# Patient Record
Sex: FEMALE | Race: WHITE | NOT HISPANIC OR LATINO | Employment: FULL TIME | URBAN - NONMETROPOLITAN AREA
[De-identification: names, ages, dates, MRNs, and addresses within clinical notes are randomized per-mention and may not be internally consistent; named-entity substitution may affect disease eponyms.]

---

## 2023-03-28 DIAGNOSIS — I10 ESSENTIAL (PRIMARY) HYPERTENSION: ICD-10-CM

## 2023-03-28 RX ORDER — LOSARTAN POTASSIUM 50 MG/1
TABLET ORAL
Qty: 45 TABLET | Refills: 3 | Status: SHIPPED | OUTPATIENT
Start: 2023-03-28 | End: 2024-02-06 | Stop reason: ALTCHOICE

## 2023-04-30 DIAGNOSIS — I10 ESSENTIAL (PRIMARY) HYPERTENSION: ICD-10-CM

## 2023-05-01 RX ORDER — LOSARTAN POTASSIUM 25 MG/1
TABLET ORAL
Qty: 90 TABLET | Refills: 11 | Status: SHIPPED | OUTPATIENT
Start: 2023-05-01 | End: 2023-11-27 | Stop reason: ALTCHOICE

## 2023-11-27 ENCOUNTER — OFFICE VISIT (OUTPATIENT)
Dept: PRIMARY CARE | Facility: CLINIC | Age: 43
End: 2023-11-27
Payer: COMMERCIAL

## 2023-11-27 VITALS
OXYGEN SATURATION: 98 % | WEIGHT: 152 LBS | BODY MASS INDEX: 23.86 KG/M2 | HEIGHT: 67 IN | DIASTOLIC BLOOD PRESSURE: 116 MMHG | HEART RATE: 92 BPM | SYSTOLIC BLOOD PRESSURE: 171 MMHG

## 2023-11-27 DIAGNOSIS — I10 BENIGN ESSENTIAL HYPERTENSION: ICD-10-CM

## 2023-11-27 DIAGNOSIS — Z87.442 PERSONAL HISTORY OF RENAL CALCULI: ICD-10-CM

## 2023-11-27 DIAGNOSIS — R10.9 RIGHT FLANK PAIN: Primary | ICD-10-CM

## 2023-11-27 DIAGNOSIS — R11.0 NAUSEA: ICD-10-CM

## 2023-11-27 PROBLEM — R79.82 CRP ELEVATED: Status: RESOLVED | Noted: 2023-11-27 | Resolved: 2023-11-27

## 2023-11-27 PROBLEM — M19.90 OSTEOARTHRITIS: Status: ACTIVE | Noted: 2023-11-27

## 2023-11-27 PROBLEM — R79.89 LFT ELEVATION: Status: RESOLVED | Noted: 2023-11-27 | Resolved: 2023-11-27

## 2023-11-27 PROBLEM — K57.90 DIVERTICULOSIS: Status: RESOLVED | Noted: 2023-11-27 | Resolved: 2023-11-27

## 2023-11-27 PROBLEM — H81.10 BENIGN POSITIONAL VERTIGO: Status: RESOLVED | Noted: 2023-11-27 | Resolved: 2023-11-27

## 2023-11-27 PROBLEM — N20.0 CALCULUS OF KIDNEY: Status: RESOLVED | Noted: 2023-11-27 | Resolved: 2023-11-27

## 2023-11-27 PROBLEM — F41.1 GENERALIZED ANXIETY DISORDER WITH PANIC ATTACKS: Status: ACTIVE | Noted: 2023-11-27

## 2023-11-27 PROBLEM — R63.0 POOR APPETITE: Status: ACTIVE | Noted: 2023-11-27

## 2023-11-27 PROBLEM — R70.0 ELEVATED ERYTHROCYTE SEDIMENTATION RATE: Status: RESOLVED | Noted: 2023-11-27 | Resolved: 2023-11-27

## 2023-11-27 PROBLEM — R35.1 NOCTURIA: Status: ACTIVE | Noted: 2023-11-27

## 2023-11-27 PROBLEM — M25.50 ARTHRALGIA: Status: ACTIVE | Noted: 2023-11-27

## 2023-11-27 PROBLEM — J30.9 ALLERGIC RHINITIS: Status: ACTIVE | Noted: 2023-11-27

## 2023-11-27 PROBLEM — M67.431 GANGLION, RIGHT WRIST: Status: RESOLVED | Noted: 2019-03-05 | Resolved: 2023-11-27

## 2023-11-27 PROBLEM — I88.9 LYMPHADENITIS: Status: RESOLVED | Noted: 2023-11-27 | Resolved: 2023-11-27

## 2023-11-27 PROBLEM — F41.0 GENERALIZED ANXIETY DISORDER WITH PANIC ATTACKS: Status: ACTIVE | Noted: 2023-11-27

## 2023-11-27 PROBLEM — F43.10 PTSD (POST-TRAUMATIC STRESS DISORDER): Status: ACTIVE | Noted: 2023-11-27

## 2023-11-27 PROBLEM — M77.00 MEDIAL EPICONDYLITIS: Status: RESOLVED | Noted: 2023-11-27 | Resolved: 2023-11-27

## 2023-11-27 PROBLEM — Z86.16 HISTORY OF COVID-19: Status: RESOLVED | Noted: 2023-11-27 | Resolved: 2023-11-27

## 2023-11-27 LAB
POC APPEARANCE, URINE: CLEAR
POC BILIRUBIN, URINE: NEGATIVE
POC BLOOD, URINE: ABNORMAL
POC COLOR, URINE: YELLOW
POC GLUCOSE, URINE: NEGATIVE MG/DL
POC KETONES, URINE: NEGATIVE MG/DL
POC LEUKOCYTES, URINE: NEGATIVE
POC NITRITE,URINE: NEGATIVE
POC PH, URINE: 5.5 PH
POC PROTEIN, URINE: NEGATIVE MG/DL
POC SPECIFIC GRAVITY, URINE: 1.01
POC UROBILINOGEN, URINE: 0.2 EU/DL

## 2023-11-27 PROCEDURE — 87086 URINE CULTURE/COLONY COUNT: CPT

## 2023-11-27 PROCEDURE — 99214 OFFICE O/P EST MOD 30 MIN: CPT | Performed by: PHYSICIAN ASSISTANT

## 2023-11-27 PROCEDURE — 1036F TOBACCO NON-USER: CPT | Performed by: PHYSICIAN ASSISTANT

## 2023-11-27 PROCEDURE — 81003 URINALYSIS AUTO W/O SCOPE: CPT | Performed by: PHYSICIAN ASSISTANT

## 2023-11-27 PROCEDURE — 3077F SYST BP >= 140 MM HG: CPT | Performed by: PHYSICIAN ASSISTANT

## 2023-11-27 PROCEDURE — 3080F DIAST BP >= 90 MM HG: CPT | Performed by: PHYSICIAN ASSISTANT

## 2023-11-27 RX ORDER — TRAMADOL HYDROCHLORIDE 50 MG/1
50 TABLET ORAL EVERY 8 HOURS PRN
Qty: 9 TABLET | Refills: 0 | Status: SHIPPED | OUTPATIENT
Start: 2023-11-27 | End: 2023-11-30

## 2023-11-27 RX ORDER — HYDROCHLOROTHIAZIDE 25 MG/1
25 TABLET ORAL DAILY
COMMUNITY
Start: 2017-12-29 | End: 2024-03-13 | Stop reason: SDUPTHER

## 2023-11-27 RX ORDER — PROMETHAZINE HYDROCHLORIDE 25 MG/1
25 TABLET ORAL EVERY 8 HOURS PRN
Qty: 30 TABLET | Refills: 0 | Status: SHIPPED | OUTPATIENT
Start: 2023-11-27 | End: 2024-02-06 | Stop reason: ALTCHOICE

## 2023-11-27 ASSESSMENT — ENCOUNTER SYMPTOMS
VOMITING: 0
CONSTIPATION: 0
SLEEP DISTURBANCE: 0
FATIGUE: 1
DIARRHEA: 0
CHEST TIGHTNESS: 0
ABDOMINAL PAIN: 0
EYE DISCHARGE: 0
FLANK PAIN: 1
BACK PAIN: 0
BRUISES/BLEEDS EASILY: 0
CHILLS: 0
NAUSEA: 1
RHINORRHEA: 0
DIZZINESS: 0
SINUS PAIN: 0
HEADACHES: 0
WHEEZING: 0
CONFUSION: 0
NUMBNESS: 0
NECK PAIN: 0
SHORTNESS OF BREATH: 0
PALPITATIONS: 0
SORE THROAT: 0
EYE REDNESS: 0
FREQUENCY: 0
FEVER: 0
COUGH: 0
WOUND: 0
TREMORS: 0

## 2023-11-27 ASSESSMENT — PATIENT HEALTH QUESTIONNAIRE - PHQ9
SUM OF ALL RESPONSES TO PHQ9 QUESTIONS 1 AND 2: 0
1. LITTLE INTEREST OR PLEASURE IN DOING THINGS: NOT AT ALL
2. FEELING DOWN, DEPRESSED OR HOPELESS: NOT AT ALL

## 2023-11-27 NOTE — PROGRESS NOTES
Subjective   Patient ID: Elva Perez is a 43 y.o. female who presents for Follow-up (R-FLANK PAIN X 5 DAYS NOW. HAS NOT NOTICED ANY BLOOD IN HER URINE. STATED SHE DID HAVE SOME NAUSEA TOO.  HX OF KIDNEY STONES ABOUT 5 YEARS AGO SHE STATES HER SYMP FROM THAT FEEL FAMILIAR. )    HPI  Pt presents today as MMT patient for     1 possible kidney stones   R flank pain x 5 days  In office urine small blood noted   Neg leukocytes and nitrates but will send to culture given her symptoms  Nausea   She does have hx of kidney stones years ago and feels similar   Pt states hx of following with uro - dr foster   Pt states she has been taking tylenol and ibuprofen and min relief (originally she thought was M/S) but has since progressed   Heat and little relief   Pt denies known fever    HTN - on losartan and hydrochlorothiazide - admits she is not faithful taking meds and did not take yet today.  She denies symptoms of high BP       Preventative   Fall - NEG NOV 2023   PHQ2 NEG NOV 2023     There is no problem list on file for this patient.      Review of Systems   Constitutional:  Positive for fatigue. Negative for chills and fever.   HENT:  Negative for congestion, rhinorrhea, sinus pain, sore throat and tinnitus.    Eyes:  Negative for discharge, redness and visual disturbance.   Respiratory:  Negative for cough, chest tightness, shortness of breath and wheezing.    Cardiovascular:  Negative for chest pain, palpitations and leg swelling.   Gastrointestinal:  Positive for nausea. Negative for abdominal pain, constipation, diarrhea and vomiting.   Endocrine: Negative for cold intolerance and heat intolerance.   Genitourinary:  Positive for decreased urine volume and flank pain. Negative for frequency and urgency.   Musculoskeletal:  Negative for back pain, gait problem and neck pain.   Skin:  Negative for rash and wound.   Neurological:  Negative for dizziness, tremors, syncope, numbness and headaches.   Hematological:   "Does not bruise/bleed easily.   Psychiatric/Behavioral:  Negative for confusion, sleep disturbance and suicidal ideas.        Past Medical History:   Diagnosis Date    Encounter for gynecological examination (general) (routine) without abnormal findings     Encounter for cervical Pap smear with pelvic exam    Immunization not carried out because of patient refusal     Influenza vaccination declined    Personal history of other diseases of the female genital tract 2020    History of endometriosis    Personal history of other diseases of the female genital tract     History of dyspareunia in female    Personal history of other medical treatment     History of diagnostic mammography       Past Surgical History:   Procedure Laterality Date    OTHER SURGICAL HISTORY  10/09/2019    Hysterectomy total    OTHER SURGICAL HISTORY  10/09/2019     section    OTHER SURGICAL HISTORY  2020    Lithotripsy    OTHER SURGICAL HISTORY  2020    Laparoscopy    OTHER SURGICAL HISTORY  2021    Abdominal liposuction       Family History   Problem Relation Name Age of Onset    Lung cancer Mother      Lung cancer Father      Diabetes Father      Cancer Sister         Social History     Tobacco Use    Smoking status: Never    Smokeless tobacco: Never   Vaping Use    Vaping Use: Never used   Substance Use Topics    Alcohol use: Yes     Comment: SOCIALLY    Drug use: Never       Allergies   Allergen Reactions    Magnesium Sulfate Other     Fluid on lungs    Codeine Unknown       Current Outpatient Medications   Medication Sig Dispense Refill    hydroCHLOROthiazide (HYDRODiuril) 25 mg tablet Take 1 tablet (25 mg) by mouth once daily.      losartan (Cozaar) 50 mg tablet TAKE 1.5 TABLETS BY MOUTH DAILY 45 tablet 3     No current facility-administered medications for this visit.       Objective   BP (!) 171/116   Pulse 92   Ht 1.702 m (5' 7\")   Wt 68.9 kg (152 lb)   SpO2 98%   BMI 23.81 kg/m²     Physical " Exam  Vitals reviewed.   Constitutional:       Appearance: Normal appearance. She is normal weight.   HENT:      Head: Normocephalic.      Right Ear: External ear normal.      Left Ear: External ear normal.      Nose: Nose normal. No congestion or rhinorrhea.      Mouth/Throat:      Mouth: Mucous membranes are moist.   Eyes:      Extraocular Movements: Extraocular movements intact.      Conjunctiva/sclera: Conjunctivae normal.      Pupils: Pupils are equal, round, and reactive to light.   Cardiovascular:      Rate and Rhythm: Normal rate and regular rhythm.      Pulses: Normal pulses.   Pulmonary:      Effort: Pulmonary effort is normal.      Breath sounds: Normal breath sounds.   Abdominal:      General: Bowel sounds are normal.      Palpations: Abdomen is soft.      Tenderness: There is no abdominal tenderness. There is right CVA tenderness. There is no left CVA tenderness.   Musculoskeletal:         General: No tenderness. Normal range of motion.      Cervical back: Normal range of motion and neck supple. No tenderness.   Skin:     General: Skin is warm and dry.   Neurological:      General: No focal deficit present.      Mental Status: She is alert and oriented to person, place, and time.   Psychiatric:         Mood and Affect: Mood normal.         Behavior: Behavior normal.       Testing   Reviewed old imaging on file       Impression    MDM    1) COMPLEXITY: 1 UNDIAGNOSED NEW PROBLEM WITH UNCERTAIN PROGNOSIS  2)DATA: TESTS INTERPRETED AND OR ORDERED, TOOK INDEPENDENT HISTORY OR RECORDS REVIEWED  3)RISK: MODERATE RISK DUE TO NATURE OF MEDICAL CONDITIONS/COMORBIDITY OR MEDICATIONS ORDERED OR SURGICAL OR PROCEDURE REFERRAL, .       Reviewed labs and Testing on file   Patient to follow diet low in cholesterol, fat, and sodium.    Patient is advised to increase Exercise.  Patient is recommended to lose weight.  Reviewed Meds and discussed common side effects  Continue as directed   Flank pain - suspicious of kidney  stones with her history - will order imaging and assist with follow up with her urologist  Phenergan given for prn   Pain is progressing and no relief with NSAID or tylenol  I will give a 1 time 3 day supply of ultram prn - pt denies s.e or known allergy with this med  She is to use sparingly   Reviewed OARRS and is consistent and appropriate with prescribed medicines.  We discussed and considered risks for abuse, addiction, diversion, dependence. Medicine is felt to be clinically appropriate based on documented diagnosis  Contract not signs and urine not done as this was short time 1 time prescription   Consider script for flomax as discussed   Pt to tx supportively   If sx worsen to go to th ER   HTN - not to goal - not taking meds as directed - adivsed to restart her meds   Discussed need for follow up with labs and gen check up when the R flank pain is better managed   Patient is strongly advised to be compliant with recommendations.    Return to Clinic sooner if needed.  Patient denies further questions/concerns at this time     Assessment/Plan   Problem List Items Addressed This Visit             ICD-10-CM    Benign essential hypertension I10     Other Visit Diagnoses         Codes    Right flank pain    -  Primary R10.9    Relevant Medications    promethazine (Phenergan) 25 mg tablet    traMADol (Ultram) 50 mg tablet    Other Relevant Orders    POCT UA Automated manually resulted (Completed)    Urine Culture    XR abdomen 1 view    Personal history of renal calculi     Z87.442    Nausea     R11.0            FU in 3-5 days with flank pain check   Call dr foster to see if we can assist with visit - last seen 2021 - hx of kidney stones   If patient can get in with dr foster in the near future then consider follow up in our office when the kidney stones are better managed for gen check up - BP and labs (labs will need ordered as she was last seen 6 mo ago)

## 2023-11-29 LAB — BACTERIA UR CULT: NO GROWTH

## 2024-02-06 ENCOUNTER — OFFICE VISIT (OUTPATIENT)
Dept: PRIMARY CARE | Facility: CLINIC | Age: 44
End: 2024-02-06
Payer: COMMERCIAL

## 2024-02-06 VITALS
OXYGEN SATURATION: 98 % | BODY MASS INDEX: 24.75 KG/M2 | SYSTOLIC BLOOD PRESSURE: 168 MMHG | HEART RATE: 96 BPM | WEIGHT: 154 LBS | HEIGHT: 66 IN | DIASTOLIC BLOOD PRESSURE: 110 MMHG

## 2024-02-06 DIAGNOSIS — I10 BENIGN ESSENTIAL HYPERTENSION: ICD-10-CM

## 2024-02-06 DIAGNOSIS — F41.0 GENERALIZED ANXIETY DISORDER WITH PANIC ATTACKS: Primary | ICD-10-CM

## 2024-02-06 DIAGNOSIS — M19.90 OSTEOARTHRITIS, UNSPECIFIED OSTEOARTHRITIS TYPE, UNSPECIFIED SITE: ICD-10-CM

## 2024-02-06 DIAGNOSIS — J34.89 NOSE IRRITATION: ICD-10-CM

## 2024-02-06 DIAGNOSIS — Z00.00 WELLNESS EXAMINATION: ICD-10-CM

## 2024-02-06 DIAGNOSIS — F41.1 GENERALIZED ANXIETY DISORDER WITH PANIC ATTACKS: Primary | ICD-10-CM

## 2024-02-06 PROCEDURE — 1036F TOBACCO NON-USER: CPT | Performed by: NURSE PRACTITIONER

## 2024-02-06 PROCEDURE — 99214 OFFICE O/P EST MOD 30 MIN: CPT | Performed by: NURSE PRACTITIONER

## 2024-02-06 PROCEDURE — 3080F DIAST BP >= 90 MM HG: CPT | Performed by: NURSE PRACTITIONER

## 2024-02-06 PROCEDURE — 3077F SYST BP >= 140 MM HG: CPT | Performed by: NURSE PRACTITIONER

## 2024-02-06 RX ORDER — BUPROPION HYDROCHLORIDE 150 MG/1
150 TABLET ORAL EVERY MORNING
Qty: 90 TABLET | Refills: 0 | Status: SHIPPED | OUTPATIENT
Start: 2024-02-06 | End: 2024-03-13 | Stop reason: ALTCHOICE

## 2024-02-06 RX ORDER — BUSPIRONE HYDROCHLORIDE 5 MG/1
5 TABLET ORAL 3 TIMES DAILY PRN
Qty: 90 TABLET | Refills: 0 | Status: SHIPPED | OUTPATIENT
Start: 2024-02-06 | End: 2024-03-01 | Stop reason: SDUPTHER

## 2024-02-06 RX ORDER — FERROUS SULFATE, DRIED 160(50) MG
1 TABLET, EXTENDED RELEASE ORAL 2 TIMES DAILY
Qty: 180 TABLET | Refills: 3 | Status: SHIPPED | OUTPATIENT
Start: 2024-02-06

## 2024-02-06 RX ORDER — MELOXICAM 7.5 MG/1
7.5 TABLET ORAL DAILY
Qty: 90 TABLET | Refills: 0 | Status: SHIPPED | OUTPATIENT
Start: 2024-02-06 | End: 2024-03-13 | Stop reason: ALTCHOICE

## 2024-02-06 RX ORDER — LOSARTAN POTASSIUM 100 MG/1
100 TABLET ORAL DAILY
Qty: 90 TABLET | Refills: 3 | Status: SHIPPED | OUTPATIENT
Start: 2024-02-06

## 2024-02-06 RX ORDER — MUPIROCIN 20 MG/G
OINTMENT TOPICAL 3 TIMES DAILY
Qty: 22 G | Refills: 0 | Status: SHIPPED | OUTPATIENT
Start: 2024-02-06 | End: 2024-02-16

## 2024-02-06 ASSESSMENT — ENCOUNTER SYMPTOMS
CONSTIPATION: 0
WOUND: 0
COUGH: 0
SHORTNESS OF BREATH: 0
JOINT SWELLING: 0
FREQUENCY: 0
BLOOD IN STOOL: 0
NAUSEA: 0
FATIGUE: 0
PALPITATIONS: 0
APNEA: 0
FLANK PAIN: 0
FACIAL ASYMMETRY: 0
TROUBLE SWALLOWING: 0
DIARRHEA: 0
VOMITING: 0
DIZZINESS: 0
ABDOMINAL PAIN: 0
DYSURIA: 0
WHEEZING: 0
HEMATURIA: 0
NUMBNESS: 0
HEADACHES: 0
MYALGIAS: 0
CHEST TIGHTNESS: 0
UNEXPECTED WEIGHT CHANGE: 0
FEVER: 0
WEAKNESS: 0
NECK PAIN: 0
CONFUSION: 0
SEIZURES: 0
NERVOUS/ANXIOUS: 1
SPEECH DIFFICULTY: 0
BACK PAIN: 0
CHOKING: 0
DIFFICULTY URINATING: 0
PHOTOPHOBIA: 0
CHILLS: 0
ARTHRALGIAS: 0
EYE PAIN: 0
EYE REDNESS: 0
SLEEP DISTURBANCE: 0
SORE THROAT: 0
ABDOMINAL DISTENTION: 0

## 2024-02-06 ASSESSMENT — PATIENT HEALTH QUESTIONNAIRE - PHQ9
2. FEELING DOWN, DEPRESSED OR HOPELESS: NOT AT ALL
1. LITTLE INTEREST OR PLEASURE IN DOING THINGS: NOT AT ALL
SUM OF ALL RESPONSES TO PHQ9 QUESTIONS 1 AND 2: 0

## 2024-02-06 NOTE — PROGRESS NOTES
"Subjective   Patient ID: Elva Perez is a 43 y.o. female who presents for Med Refill (PT C/O NOSE BLEEDS, PT C/O DRY NOSE X 1 MONTH . PT WOULD ALSO LIKE TO RE START WELLBUTRIN ).      HPI:  Presents today for C/O DRY NOSE X 1 MONTH  modifying factors consists of NONE  associated symptoms consist of STATES NOSE BLEEDS ON/OFF. NASAL PAIN.  prior treatment consists of medication HUMIDIFIER, WHICH HAS HELPED.     HTN- INCREASE LOSARTAN  MG FROM 50 MG. MONITOR BP AT HOME  ANXIETY- REQUESTING TO RESTART WELLBUTRIN. START BUSPAR PRN   MAMMO- REFUSING AT THIS TIME  OA- START MOBIC       Visit Vitals  BP (!) 168/110 (BP Location: Left arm, Patient Position: Sitting)   Pulse 96   Ht 1.676 m (5' 6\")   Wt 69.9 kg (154 lb)   SpO2 98%   BMI 24.86 kg/m²   Smoking Status Never   BSA 1.8 m²        Review of Systems   Constitutional:  Negative for chills, fatigue, fever and unexpected weight change.   HENT:  Negative for congestion, ear pain, sore throat and trouble swallowing.    Eyes:  Negative for photophobia, pain, redness and visual disturbance.   Respiratory:  Negative for apnea, cough, choking, chest tightness, shortness of breath and wheezing.    Cardiovascular:  Negative for chest pain, palpitations and leg swelling.   Gastrointestinal:  Negative for abdominal distention, abdominal pain, blood in stool, constipation, diarrhea, nausea and vomiting.   Genitourinary:  Negative for difficulty urinating, dysuria, flank pain, frequency, hematuria and urgency.   Musculoskeletal:  Negative for arthralgias, back pain, gait problem, joint swelling, myalgias and neck pain.   Skin:  Negative for rash and wound.   Neurological:  Negative for dizziness, seizures, syncope, facial asymmetry, speech difficulty, weakness, numbness and headaches.   Psychiatric/Behavioral:  Negative for confusion, sleep disturbance and suicidal ideas. The patient is nervous/anxious.        Objective     Physical Exam  Constitutional:       " Appearance: Normal appearance. She is normal weight.   HENT:      Head: Normocephalic.      Nose:      Comments: B/L TURBINATES RED AND INFLAMED   Eyes:      Extraocular Movements: Extraocular movements intact.      Conjunctiva/sclera: Conjunctivae normal.      Pupils: Pupils are equal, round, and reactive to light.   Cardiovascular:      Rate and Rhythm: Normal rate and regular rhythm.      Pulses: Normal pulses.      Heart sounds: Normal heart sounds.   Pulmonary:      Effort: Pulmonary effort is normal.      Breath sounds: Normal breath sounds.   Musculoskeletal:         General: Normal range of motion.      Cervical back: Normal range of motion.   Skin:     General: Skin is warm and dry.   Neurological:      General: No focal deficit present.      Mental Status: She is alert and oriented to person, place, and time.   Psychiatric:         Mood and Affect: Mood normal.         Behavior: Behavior normal.         Thought Content: Thought content normal.         Judgment: Judgment normal.            Assessment/Plan   Problem List Items Addressed This Visit       Benign essential hypertension    Relevant Medications    losartan (Cozaar) 100 mg tablet    Generalized anxiety disorder with panic attacks - Primary    Relevant Medications    buPROPion XL (Wellbutrin XL) 150 mg 24 hr tablet    busPIRone (Buspar) 5 mg tablet    Osteoarthritis    Relevant Medications    meloxicam (Mobic) 7.5 mg tablet     Other Visit Diagnoses       Nose irritation        Relevant Medications    mupirocin (Bactroban) 2 % ointment    Wellness examination        Relevant Medications    calcium carbonate-vitamin D3 (Hi-Caleb Plus Vit D) 500 mg-5 mcg (200 unit) tablet    Other Relevant Orders    Hemoglobin A1C    TSH with reflex to Free T4 if abnormal    Lipid Panel    Comprehensive Metabolic Panel    CBC and Auto Differential        WE DISCUSSED MOST COMMON SIDE EFFECTS OF PRESCRIBED MEDICATIONS. INDICATIONS, RISK, COMPLICATIONS, AND ALTERNATIVES  OF MEDICATION/THERAPEUTICS WERE EXPLAINED AND DISCUSSED. PLEASE MONITOR CLOSELY FOR ANY UNTOWARD SIDE EFFECTS OR COMPLICATIONS OF MEDICATIONS. PATIENT IS STRONGLY ADVISED TO BE COMPLIANT WITH RECOMMENDATIONS. QUESTIONS AND CONCERNS WERE ADDRESSED. INSTRUCTED TO CALL, RETURN SOONER, OR GO TO THE ER,  IF SYMPTOMS PERSIST OR WORSEN. THEY VOICED UNDERSTANDING AND  DENIES FURTHER QUESTIONS AT THIS TIME.    TIME CODE  1. PREPARATION FOR PATIENT'S VISIT (REVIEWING CHART, CURRENT MEDICAL RECORDS, OUTSIDE HEALTH PROVIDER RECORDS, PREVIOUS HISTORY, EXAM, TEST, PROCEDURE, AND MEDICATIONS)  2. FACE TO FACE ENCOUNTER OBTAINING HISTORY FROM THE PATIENT/FAMILY/CAREGIVERS; PERFORMING EVALUATION AND EXAMINATION; ORDERING TESTS OR PROCEDURES; REFERRING AND COMMUNICATING WITH OTHER HEALTHCARE PROVIDERS; COUNSELING AND EDUCATION OF THE PATIENT/FAMILY/CAREGIVERS; INDEPENDENTLY INTERPRETING RESULTS (TESTS, LABS, PROCEDURES, IMAGING) AND COMMUNICATING AND EXPLAINING RESULTS TO THE PATIENT/FAMILY/CAREGIVERS  3. COORDINATION OF CARE; PREPARING AND PRINTING DISCHARGE INSTRUCTIONS AND ANY EDUCATIONAL MATERIAL FOR THE PATIENT/FAMILY/CAREGIVERS. DOCUMENTING CLINICAL INFORMATION IN THE ELECTRONIC MEDICAL RECORD   4. REVIEWING OARRS AS NEEDED    MDM  1) COMPLEXITY: MORE THAN 1 STABLE CHRONIC CONDITION ADDRESSED OR 1 ACUTE ILLNESS ADDRESSED   2)DATA: TESTS INTERPRETED AND OR ORDERED, TOOK INDEPENDENT HISTORY OR RECORDS REVIEWED  3)RISK: MODERATE RISK DUE TO NATURE OF MEDICAL CONDITIONS/COMORBIDITY OR MEDICATIONS ORDERED OR SURGICAL OR PROCEDURE REFERRAL    1 MONTH

## 2024-03-01 DIAGNOSIS — F41.0 GENERALIZED ANXIETY DISORDER WITH PANIC ATTACKS: ICD-10-CM

## 2024-03-01 DIAGNOSIS — F41.1 GENERALIZED ANXIETY DISORDER WITH PANIC ATTACKS: ICD-10-CM

## 2024-03-01 RX ORDER — BUSPIRONE HYDROCHLORIDE 5 MG/1
5 TABLET ORAL 3 TIMES DAILY PRN
Qty: 90 TABLET | Refills: 0 | Status: SHIPPED | OUTPATIENT
Start: 2024-03-01 | End: 2024-03-13 | Stop reason: ALTCHOICE

## 2024-03-13 ENCOUNTER — OFFICE VISIT (OUTPATIENT)
Dept: PRIMARY CARE | Facility: CLINIC | Age: 44
End: 2024-03-13
Payer: COMMERCIAL

## 2024-03-13 VITALS
DIASTOLIC BLOOD PRESSURE: 88 MMHG | SYSTOLIC BLOOD PRESSURE: 138 MMHG | HEIGHT: 66 IN | HEART RATE: 92 BPM | BODY MASS INDEX: 25.71 KG/M2 | WEIGHT: 160 LBS

## 2024-03-13 DIAGNOSIS — F43.10 PTSD (POST-TRAUMATIC STRESS DISORDER): ICD-10-CM

## 2024-03-13 DIAGNOSIS — F41.0 GENERALIZED ANXIETY DISORDER WITH PANIC ATTACKS: Primary | ICD-10-CM

## 2024-03-13 DIAGNOSIS — Z79.899 MEDICATION MANAGEMENT: ICD-10-CM

## 2024-03-13 DIAGNOSIS — I10 BENIGN ESSENTIAL HYPERTENSION: ICD-10-CM

## 2024-03-13 DIAGNOSIS — M79.7 FIBROMYALGIA: ICD-10-CM

## 2024-03-13 DIAGNOSIS — F41.1 GENERALIZED ANXIETY DISORDER WITH PANIC ATTACKS: Primary | ICD-10-CM

## 2024-03-13 PROCEDURE — 80358 DRUG SCREENING METHADONE: CPT

## 2024-03-13 PROCEDURE — 80307 DRUG TEST PRSMV CHEM ANLYZR: CPT

## 2024-03-13 PROCEDURE — 3079F DIAST BP 80-89 MM HG: CPT | Performed by: NURSE PRACTITIONER

## 2024-03-13 PROCEDURE — 1036F TOBACCO NON-USER: CPT | Performed by: NURSE PRACTITIONER

## 2024-03-13 PROCEDURE — 80346 BENZODIAZEPINES1-12: CPT

## 2024-03-13 PROCEDURE — 80361 OPIATES 1 OR MORE: CPT

## 2024-03-13 PROCEDURE — 82570 ASSAY OF URINE CREATININE: CPT

## 2024-03-13 PROCEDURE — 80365 DRUG SCREENING OXYCODONE: CPT

## 2024-03-13 PROCEDURE — 80368 SEDATIVE HYPNOTICS: CPT

## 2024-03-13 PROCEDURE — 80373 DRUG SCREENING TRAMADOL: CPT

## 2024-03-13 PROCEDURE — 3075F SYST BP GE 130 - 139MM HG: CPT | Performed by: NURSE PRACTITIONER

## 2024-03-13 PROCEDURE — 99214 OFFICE O/P EST MOD 30 MIN: CPT | Performed by: NURSE PRACTITIONER

## 2024-03-13 PROCEDURE — 80354 DRUG SCREENING FENTANYL: CPT

## 2024-03-13 RX ORDER — HYDROCHLOROTHIAZIDE 25 MG/1
25 TABLET ORAL DAILY
Qty: 90 TABLET | Refills: 3 | Status: SHIPPED | OUTPATIENT
Start: 2024-03-13

## 2024-03-13 RX ORDER — BUPROPION HYDROCHLORIDE 300 MG/1
300 TABLET ORAL EVERY MORNING
Qty: 90 TABLET | Refills: 1 | Status: SHIPPED | OUTPATIENT
Start: 2024-03-13

## 2024-03-13 RX ORDER — GABAPENTIN 300 MG/1
300 CAPSULE ORAL 3 TIMES DAILY
Qty: 90 CAPSULE | Refills: 2 | Status: SHIPPED | OUTPATIENT
Start: 2024-03-13

## 2024-03-13 ASSESSMENT — ENCOUNTER SYMPTOMS
CHILLS: 0
PHOTOPHOBIA: 0
SEIZURES: 0
WEAKNESS: 0
MYALGIAS: 0
COUGH: 0
FEVER: 0
EYE REDNESS: 0
VOMITING: 0
PALPITATIONS: 0
CONFUSION: 0
WHEEZING: 0
CONSTIPATION: 0
BACK PAIN: 0
NAUSEA: 0
ABDOMINAL PAIN: 0
NECK PAIN: 0
ABDOMINAL DISTENTION: 0
UNEXPECTED WEIGHT CHANGE: 0
FATIGUE: 1
NERVOUS/ANXIOUS: 0
DIFFICULTY URINATING: 0
SORE THROAT: 0
FLANK PAIN: 0
SHORTNESS OF BREATH: 0
FACIAL ASYMMETRY: 0
CHEST TIGHTNESS: 0
DIZZINESS: 0
NUMBNESS: 0
SLEEP DISTURBANCE: 0
WOUND: 0
APNEA: 0
ARTHRALGIAS: 1
HEMATURIA: 0
BLOOD IN STOOL: 0
EYE PAIN: 0
CHOKING: 0
HEADACHES: 0
FREQUENCY: 0
JOINT SWELLING: 0
DYSURIA: 0
TROUBLE SWALLOWING: 0
SPEECH DIFFICULTY: 0
DIARRHEA: 0

## 2024-03-13 ASSESSMENT — PATIENT HEALTH QUESTIONNAIRE - PHQ9
2. FEELING DOWN, DEPRESSED OR HOPELESS: NOT AT ALL
SUM OF ALL RESPONSES TO PHQ9 QUESTIONS 1 AND 2: 0
1. LITTLE INTEREST OR PLEASURE IN DOING THINGS: NOT AT ALL

## 2024-03-13 NOTE — PROGRESS NOTES
"Subjective   Patient ID: Elva Perez is a 43 y.o. female who presents for Follow-up (1 month med check and bp).      HPI:  Presents today for 1 MONTH MED CHECK AND BP CHECK. SHE IS DOING WELL ON WELLBUTRIN BUT DID INCREASE MED  MG DAILY FROM 150 MG ABOUT 1 MONTH AGO.       HTN- STABLE  FIBRO - INCREASED JOINT PAIN AND FATIGUE. SHE HAS BEEN ON GABAPENTIN IN THE PAST, WOULD LIKE TO RESTART MED.  START GABAPENTIN  300 MG TID    OARRS:  Alejandra Waterman, APRN-CNP on 3/13/2024 11:07 AM  I have personally reviewed the OARRS report for Elva Perez. I have considered the risks of abuse, dependence, addiction and diversion and I believe that it is clinically appropriate for Elva Perez to be prescribed this medication    Is the patient prescribed a combination of a benzodiazepine and opioid?  No    Last Urine Drug Screen / ordered today: Yes  No results found for this or any previous visit (from the past 8760 hour(s)).  N/A        Controlled Substance Agreement:  Date of the Last Agreement: 03/13/2024  Reviewed Controlled Substance Agreement including but not limited to the benefits, risks, and alternatives to treatment with a Controlled Substance medication(s).        Visit Vitals  /88 (BP Location: Right arm, Patient Position: Sitting)   Pulse 92   Ht 1.676 m (5' 6\")   Wt 72.6 kg (160 lb)   BMI 25.82 kg/m²   Smoking Status Never   BSA 1.84 m²        Review of Systems   Constitutional:  Positive for fatigue. Negative for chills, fever and unexpected weight change.   HENT:  Negative for congestion, ear pain, sore throat and trouble swallowing.    Eyes:  Negative for photophobia, pain, redness and visual disturbance.   Respiratory:  Negative for apnea, cough, choking, chest tightness, shortness of breath and wheezing.    Cardiovascular:  Negative for chest pain, palpitations and leg swelling.   Gastrointestinal:  Negative for abdominal distention, abdominal pain, blood in stool, " constipation, diarrhea, nausea and vomiting.   Genitourinary:  Negative for difficulty urinating, dysuria, flank pain, frequency, hematuria and urgency.   Musculoskeletal:  Positive for arthralgias. Negative for back pain, gait problem, joint swelling, myalgias and neck pain.   Skin:  Negative for rash and wound.   Neurological:  Negative for dizziness, seizures, syncope, facial asymmetry, speech difficulty, weakness, numbness and headaches.   Psychiatric/Behavioral:  Negative for confusion, sleep disturbance and suicidal ideas. The patient is not nervous/anxious.        Objective     Physical Exam  Constitutional:       Appearance: Normal appearance. She is normal weight.   HENT:      Head: Normocephalic.   Eyes:      Extraocular Movements: Extraocular movements intact.      Conjunctiva/sclera: Conjunctivae normal.      Pupils: Pupils are equal, round, and reactive to light.   Cardiovascular:      Rate and Rhythm: Normal rate and regular rhythm.      Pulses: Normal pulses.      Heart sounds: Normal heart sounds.   Pulmonary:      Effort: Pulmonary effort is normal.      Breath sounds: Normal breath sounds.   Musculoskeletal:         General: Normal range of motion.      Cervical back: Normal range of motion.   Skin:     General: Skin is warm and dry.   Neurological:      General: No focal deficit present.      Mental Status: She is alert and oriented to person, place, and time.   Psychiatric:         Mood and Affect: Mood normal.         Behavior: Behavior normal.         Thought Content: Thought content normal.         Judgment: Judgment normal.            Assessment/Plan   Problem List Items Addressed This Visit       Benign essential hypertension    Relevant Medications    hydroCHLOROthiazide (HYDRODiuril) 25 mg tablet    Generalized anxiety disorder with panic attacks - Primary    Relevant Medications    buPROPion XL (Wellbutrin XL) 300 mg 24 hr tablet    PTSD (post-traumatic stress disorder)    Relevant  Medications    buPROPion XL (Wellbutrin XL) 300 mg 24 hr tablet    Fibromyalgia    Relevant Medications    gabapentin (Neurontin) 300 mg capsule    Medication management    Relevant Orders    Opiate/Opioid/Benzo Prescription Compliance    OOB Internal Tracking       WE DISCUSSED MOST COMMON SIDE EFFECTS OF PRESCRIBED MEDICATIONS. INDICATIONS, RISK, COMPLICATIONS, AND ALTERNATIVES OF MEDICATION/THERAPEUTICS WERE EXPLAINED AND DISCUSSED. PLEASE MONITOR CLOSELY FOR ANY UNTOWARD SIDE EFFECTS OR COMPLICATIONS OF MEDICATIONS. PATIENT IS STRONGLY ADVISED TO BE COMPLIANT WITH RECOMMENDATIONS. QUESTIONS AND CONCERNS WERE ADDRESSED. INSTRUCTED TO CALL, RETURN SOONER, OR GO TO THE ER,  IF SYMPTOMS PERSIST OR WORSEN. THEY VOICED UNDERSTANDING AND  DENIES FURTHER QUESTIONS AT THIS TIME.    TIME CODE  1. PREPARATION FOR PATIENT'S VISIT (REVIEWING CHART, CURRENT MEDICAL RECORDS, OUTSIDE HEALTH PROVIDER RECORDS, PREVIOUS HISTORY, EXAM, TEST, PROCEDURE, AND MEDICATIONS)  2. FACE TO FACE ENCOUNTER OBTAINING HISTORY FROM THE PATIENT/FAMILY/CAREGIVERS; PERFORMING EVALUATION AND EXAMINATION; ORDERING TESTS OR PROCEDURES; REFERRING AND COMMUNICATING WITH OTHER HEALTHCARE PROVIDERS; COUNSELING AND EDUCATION OF THE PATIENT/FAMILY/CAREGIVERS; INDEPENDENTLY INTERPRETING RESULTS (TESTS, LABS, PROCEDURES, IMAGING) AND COMMUNICATING AND EXPLAINING RESULTS TO THE PATIENT/FAMILY/CAREGIVERS  3. COORDINATION OF CARE; PREPARING AND PRINTING DISCHARGE INSTRUCTIONS AND ANY EDUCATIONAL MATERIAL FOR THE PATIENT/FAMILY/CAREGIVERS. DOCUMENTING CLINICAL INFORMATION IN THE ELECTRONIC MEDICAL RECORD   4. REVIEWING OARRS AS NEEDED    MDM  1) COMPLEXITY: MORE THAN 1 STABLE CHRONIC CONDITION ADDRESSED OR 1 ACUTE ILLNESS ADDRESSED   2)DATA: TESTS INTERPRETED AND OR ORDERED, TOOK INDEPENDENT HISTORY OR RECORDS REVIEWED  3)RISK: MODERATE RISK DUE TO NATURE OF MEDICAL CONDITIONS/COMORBIDITY OR MEDICATIONS ORDERED OR SURGICAL OR PROCEDURE REFERRAL    3 MONTHS WITH  LABS

## 2024-03-14 LAB
AMPHETAMINES UR QL SCN: NORMAL
BARBITURATES UR QL SCN: NORMAL
BZE UR QL SCN: NORMAL
CANNABINOIDS UR QL SCN: NORMAL
CREAT UR-MCNC: 33.2 MG/DL (ref 20–320)
PCP UR QL SCN: NORMAL

## 2024-04-22 ENCOUNTER — APPOINTMENT (OUTPATIENT)
Dept: PRIMARY CARE | Facility: CLINIC | Age: 44
End: 2024-04-22
Payer: COMMERCIAL

## 2024-04-23 ENCOUNTER — APPOINTMENT (OUTPATIENT)
Dept: PRIMARY CARE | Facility: CLINIC | Age: 44
End: 2024-04-23
Payer: COMMERCIAL

## 2024-06-13 ENCOUNTER — APPOINTMENT (OUTPATIENT)
Dept: PRIMARY CARE | Facility: CLINIC | Age: 44
End: 2024-06-13
Payer: COMMERCIAL

## 2024-06-13 VITALS
SYSTOLIC BLOOD PRESSURE: 138 MMHG | DIASTOLIC BLOOD PRESSURE: 88 MMHG | OXYGEN SATURATION: 98 % | WEIGHT: 154 LBS | HEIGHT: 66 IN | BODY MASS INDEX: 24.75 KG/M2 | HEART RATE: 92 BPM

## 2024-06-13 DIAGNOSIS — I10 BENIGN ESSENTIAL HYPERTENSION: Primary | ICD-10-CM

## 2024-06-13 DIAGNOSIS — Z12.11 ENCOUNTER FOR SCREENING FOR MALIGNANT NEOPLASM OF COLON: ICD-10-CM

## 2024-06-13 DIAGNOSIS — M79.7 FIBROMYALGIA: ICD-10-CM

## 2024-06-13 PROCEDURE — 3075F SYST BP GE 130 - 139MM HG: CPT | Performed by: NURSE PRACTITIONER

## 2024-06-13 PROCEDURE — 3079F DIAST BP 80-89 MM HG: CPT | Performed by: NURSE PRACTITIONER

## 2024-06-13 PROCEDURE — 99214 OFFICE O/P EST MOD 30 MIN: CPT | Performed by: NURSE PRACTITIONER

## 2024-06-13 PROCEDURE — 1036F TOBACCO NON-USER: CPT | Performed by: NURSE PRACTITIONER

## 2024-06-13 RX ORDER — GABAPENTIN 600 MG/1
600 TABLET ORAL 3 TIMES DAILY
Qty: 90 TABLET | Refills: 2 | Status: SHIPPED | OUTPATIENT
Start: 2024-06-13

## 2024-06-13 RX ORDER — GABAPENTIN 300 MG/1
300 CAPSULE ORAL 3 TIMES DAILY
Qty: 90 CAPSULE | Refills: 2 | Status: CANCELLED | OUTPATIENT
Start: 2024-06-13

## 2024-06-13 ASSESSMENT — ENCOUNTER SYMPTOMS
SPEECH DIFFICULTY: 0
NERVOUS/ANXIOUS: 0
CONSTIPATION: 0
ABDOMINAL DISTENTION: 0
WHEEZING: 0
DIARRHEA: 0
COUGH: 0
FACIAL ASYMMETRY: 0
CHILLS: 0
HEADACHES: 0
SHORTNESS OF BREATH: 0
DYSURIA: 0
JOINT SWELLING: 0
FATIGUE: 0
NAUSEA: 0
NUMBNESS: 0
HEMATURIA: 0
SLEEP DISTURBANCE: 0
UNEXPECTED WEIGHT CHANGE: 0
TROUBLE SWALLOWING: 0
NECK PAIN: 0
PHOTOPHOBIA: 0
SORE THROAT: 0
CHEST TIGHTNESS: 0
CONFUSION: 0
EYE REDNESS: 0
WEAKNESS: 0
BACK PAIN: 0
ABDOMINAL PAIN: 0
FLANK PAIN: 0
APNEA: 0
DIZZINESS: 0
VOMITING: 0
BLOOD IN STOOL: 0
PALPITATIONS: 0
FREQUENCY: 0
FEVER: 0
WOUND: 0
ARTHRALGIAS: 0
CHOKING: 0
SEIZURES: 0
MYALGIAS: 0
EYE PAIN: 0
DIFFICULTY URINATING: 0

## 2024-06-13 NOTE — PROGRESS NOTES
Subjective   Patient ID: Elva Perez is a 44 y.o. female who presents for Follow-up (3 months labs, pt has not completed labs at this time).      HPI:  Presents today for MED REFILL OF GABAPENTIN WHICH SHE TAKES FOR FIBRO. SHE IS DOING WELL ON MED BUT FEELS AN INCREASE WOULD HELP. NO FURTHER COMPLAINTS      BROTHER DX WITH COLON CANCER AT AGE 48 AND PASSED AWAY AT AGE 48. WILL ORDER COLON  REFUSING MAMMO AT THIS TIME      OARRS:  Alejandra Waterman, MAHAMED-CNP on 6/13/2024 11:10 AM  I have personally reviewed the OARRS report for Elva Perez. I have considered the risks of abuse, dependence, addiction and diversion and I believe that it is clinically appropriate for Elva Perez to be prescribed this medication    Is the patient prescribed a combination of a benzodiazepine and opioid?  No    Last Urine Drug Screen / ordered today: No  Recent Results (from the past 8760 hour(s))   Confirmation Opiate/Opioid/Benzo Prescription Compliance    Collection Time: 03/13/24 11:24 AM   Result Value Ref Range    Clonazepam <25 <25 ng/mL    7-Aminoclonazepam <25 <25 ng/mL    Alprazolam <25 <25 ng/mL    Alpha-Hydroxyalprazolam <25 <25 ng/mL    Midazolam <25 <25 ng/mL    Alpha-Hydroxymidazolam <25 <25 ng/mL    Chlordiazepoxide <25 <25 ng/mL    Diazepam <25 <25 ng/mL    Nordiazepam <25 <25 ng/mL    Temazepam <25 <25 ng/mL    Oxazepam <25 <25 ng/mL    Lorazepam <25 <25 ng/mL    Methadone <25 <25 ng/mL    EDDP <25 <25 ng/mL    6-Acetylmorphine <25 <25 ng/mL    Codeine <50 <50 ng/mL    Hydrocodone <25 <25 ng/mL    Hydromorphone <25 <25 ng/mL    Morphine  <50 <50 ng/mL    Norhydrocodone <25 <25 ng/mL    Noroxycodone <25 <25 ng/mL    Oxycodone <25 <25 ng/mL    Oxymorphone <25 <25 ng/mL    Fentanyl <2.5 <2.5 ng/mL    Norfentanyl <2.5 <2.5 ng/mL    Tramadol <50 <50 ng/mL    O-Desmethyltramadol <50 <50 ng/mL    Zolpidem <25 <25 ng/mL    Zolpidem Metabolite (ZCA) <25 <25 ng/mL   Screen Opiate/Opioid/Benzo Prescription  "Compliance    Collection Time: 03/13/24 11:24 AM   Result Value Ref Range    Creatinine, Urine Random 33.2 20.0 - 320.0 mg/dL    Amphetamine Screen, Urine Presumptive Negative Presumptive Negative    Barbiturate Screen, Urine Presumptive Negative Presumptive Negative    Cannabinoid Screen, Urine Presumptive Negative Presumptive Negative    Cocaine Metabolite Screen, Urine Presumptive Negative Presumptive Negative    PCP Screen, Urine Presumptive Negative Presumptive Negative     Results are as expected.         Controlled Substance Agreement:  Date of the Last Agreement: 03/13/2024  Reviewed Controlled Substance Agreement including but not limited to the benefits, risks, and alternatives to treatment with a Controlled Substance medication(s).      Visit Vitals  /88 (BP Location: Right arm, Patient Position: Sitting)   Pulse 92   Ht 1.676 m (5' 6\")   Wt 69.9 kg (154 lb)   SpO2 98%   BMI 24.86 kg/m²   Smoking Status Never   BSA 1.8 m²        Review of Systems   Constitutional:  Negative for chills, fatigue, fever and unexpected weight change.   HENT:  Negative for congestion, ear pain, sore throat and trouble swallowing.    Eyes:  Negative for photophobia, pain, redness and visual disturbance.   Respiratory:  Negative for apnea, cough, choking, chest tightness, shortness of breath and wheezing.    Cardiovascular:  Negative for chest pain, palpitations and leg swelling.   Gastrointestinal:  Negative for abdominal distention, abdominal pain, blood in stool, constipation, diarrhea, nausea and vomiting.   Genitourinary:  Negative for difficulty urinating, dysuria, flank pain, frequency, hematuria and urgency.   Musculoskeletal:  Negative for arthralgias, back pain, gait problem, joint swelling, myalgias and neck pain.   Skin:  Negative for rash and wound.   Neurological:  Negative for dizziness, seizures, syncope, facial asymmetry, speech difficulty, weakness, numbness and headaches.   Psychiatric/Behavioral:  " Negative for confusion, sleep disturbance and suicidal ideas. The patient is not nervous/anxious.        Objective     Physical Exam  Constitutional:       Appearance: Normal appearance. She is normal weight.   HENT:      Head: Normocephalic.   Eyes:      Extraocular Movements: Extraocular movements intact.      Conjunctiva/sclera: Conjunctivae normal.      Pupils: Pupils are equal, round, and reactive to light.   Cardiovascular:      Rate and Rhythm: Normal rate and regular rhythm.      Pulses: Normal pulses.      Heart sounds: Normal heart sounds.   Pulmonary:      Effort: Pulmonary effort is normal.      Breath sounds: Normal breath sounds.   Musculoskeletal:         General: Normal range of motion.      Cervical back: Normal range of motion.   Skin:     General: Skin is warm and dry.   Neurological:      General: No focal deficit present.      Mental Status: She is alert and oriented to person, place, and time.   Psychiatric:         Mood and Affect: Mood normal.         Behavior: Behavior normal.         Thought Content: Thought content normal.         Judgment: Judgment normal.            Assessment/Plan   Problem List Items Addressed This Visit       Benign essential hypertension - Primary    Fibromyalgia    Relevant Medications    gabapentin (Neurontin) 600 mg tablet     Other Visit Diagnoses       Encounter for screening for malignant neoplasm of colon        Relevant Orders    Colonoscopy Screening; High Risk Patient; BROTHER DX WITH COLON CANCER        DO LABS WITHIN THE NEXT WEEK     WE DISCUSSED MOST COMMON SIDE EFFECTS OF PRESCRIBED MEDICATIONS. INDICATIONS, RISK, COMPLICATIONS, AND ALTERNATIVES OF MEDICATION/THERAPEUTICS WERE EXPLAINED AND DISCUSSED. PLEASE MONITOR CLOSELY FOR ANY UNTOWARD SIDE EFFECTS OR COMPLICATIONS OF MEDICATIONS. PATIENT IS STRONGLY ADVISED TO BE COMPLIANT WITH RECOMMENDATIONS. QUESTIONS AND CONCERNS WERE ADDRESSED. INSTRUCTED TO CALL, RETURN SOONER, OR GO TO THE ER,  IF SYMPTOMS  PERSIST OR WORSEN. THEY VOICED UNDERSTANDING AND  DENIES FURTHER QUESTIONS AT THIS TIME.    TIME CODE  1. PREPARATION FOR PATIENT'S VISIT (REVIEWING CHART, CURRENT MEDICAL RECORDS, OUTSIDE HEALTH PROVIDER RECORDS, PREVIOUS HISTORY, EXAM, TEST, PROCEDURE, AND MEDICATIONS)  2. FACE TO FACE ENCOUNTER OBTAINING HISTORY FROM THE PATIENT/FAMILY/CAREGIVERS; PERFORMING EVALUATION AND EXAMINATION; ORDERING TESTS OR PROCEDURES; REFERRING AND COMMUNICATING WITH OTHER HEALTHCARE PROVIDERS; COUNSELING AND EDUCATION OF THE PATIENT/FAMILY/CAREGIVERS; INDEPENDENTLY INTERPRETING RESULTS (TESTS, LABS, PROCEDURES, IMAGING) AND COMMUNICATING AND EXPLAINING RESULTS TO THE PATIENT/FAMILY/CAREGIVERS  3. COORDINATION OF CARE; PREPARING AND PRINTING DISCHARGE INSTRUCTIONS AND ANY EDUCATIONAL MATERIAL FOR THE PATIENT/FAMILY/CAREGIVERS. DOCUMENTING CLINICAL INFORMATION IN THE ELECTRONIC MEDICAL RECORD   4. REVIEWING OARRS AS NEEDED    MDM  1) COMPLEXITY: MORE THAN 1 STABLE CHRONIC CONDITION ADDRESSED OR 1 ACUTE ILLNESS ADDRESSED   2)DATA: TESTS INTERPRETED AND OR ORDERED, TOOK INDEPENDENT HISTORY OR RECORDS REVIEWED  3)RISK: MODERATE RISK DUE TO NATURE OF MEDICAL CONDITIONS/COMORBIDITY OR MEDICATIONS ORDERED OR SURGICAL OR PROCEDURE REFERRAL    3 MONTHS MED REFILL

## 2024-08-15 ENCOUNTER — APPOINTMENT (OUTPATIENT)
Dept: GASTROENTEROLOGY | Facility: CLINIC | Age: 44
End: 2024-08-15
Payer: COMMERCIAL

## 2024-09-11 ENCOUNTER — APPOINTMENT (OUTPATIENT)
Dept: PRIMARY CARE | Facility: CLINIC | Age: 44
End: 2024-09-11
Payer: COMMERCIAL

## 2024-09-17 ENCOUNTER — LAB (OUTPATIENT)
Dept: LAB | Facility: LAB | Age: 44
End: 2024-09-17
Payer: COMMERCIAL

## 2024-09-17 DIAGNOSIS — Z00.00 WELLNESS EXAMINATION: ICD-10-CM

## 2024-09-17 LAB
ALBUMIN SERPL BCP-MCNC: 5 G/DL (ref 3.4–5)
ALP SERPL-CCNC: 96 U/L (ref 33–110)
ALT SERPL W P-5'-P-CCNC: 22 U/L (ref 7–45)
ANION GAP SERPL CALC-SCNC: 15 MMOL/L (ref 10–20)
AST SERPL W P-5'-P-CCNC: 18 U/L (ref 9–39)
BASOPHILS # BLD AUTO: 0.11 X10*3/UL (ref 0–0.1)
BASOPHILS NFR BLD AUTO: 1.3 %
BILIRUB SERPL-MCNC: 0.7 MG/DL (ref 0–1.2)
BUN SERPL-MCNC: 15 MG/DL (ref 6–23)
CALCIUM SERPL-MCNC: 9.6 MG/DL (ref 8.6–10.3)
CHLORIDE SERPL-SCNC: 101 MMOL/L (ref 98–107)
CHOLEST SERPL-MCNC: 303 MG/DL (ref 0–199)
CHOLESTEROL/HDL RATIO: 6.2
CO2 SERPL-SCNC: 27 MMOL/L (ref 21–32)
CREAT SERPL-MCNC: 0.89 MG/DL (ref 0.5–1.05)
EGFRCR SERPLBLD CKD-EPI 2021: 81 ML/MIN/1.73M*2
EOSINOPHIL # BLD AUTO: 0.27 X10*3/UL (ref 0–0.7)
EOSINOPHIL NFR BLD AUTO: 3.1 %
ERYTHROCYTE [DISTWIDTH] IN BLOOD BY AUTOMATED COUNT: 13.2 % (ref 11.5–14.5)
GLUCOSE SERPL-MCNC: 94 MG/DL (ref 74–99)
HCT VFR BLD AUTO: 46 % (ref 36–46)
HDLC SERPL-MCNC: 49 MG/DL
HGB BLD-MCNC: 15.2 G/DL (ref 12–16)
IMM GRANULOCYTES # BLD AUTO: 0.04 X10*3/UL (ref 0–0.7)
IMM GRANULOCYTES NFR BLD AUTO: 0.5 % (ref 0–0.9)
LDLC SERPL CALC-MCNC: 199 MG/DL
LYMPHOCYTES # BLD AUTO: 2.53 X10*3/UL (ref 1.2–4.8)
LYMPHOCYTES NFR BLD AUTO: 29 %
MCH RBC QN AUTO: 31.3 PG (ref 26–34)
MCHC RBC AUTO-ENTMCNC: 33 G/DL (ref 32–36)
MCV RBC AUTO: 95 FL (ref 80–100)
MONOCYTES # BLD AUTO: 0.53 X10*3/UL (ref 0.1–1)
MONOCYTES NFR BLD AUTO: 6.1 %
NEUTROPHILS # BLD AUTO: 5.23 X10*3/UL (ref 1.2–7.7)
NEUTROPHILS NFR BLD AUTO: 60 %
NON HDL CHOLESTEROL: 254 MG/DL (ref 0–149)
NRBC BLD-RTO: 0 /100 WBCS (ref 0–0)
PLATELET # BLD AUTO: 268 X10*3/UL (ref 150–450)
POTASSIUM SERPL-SCNC: 3.8 MMOL/L (ref 3.5–5.3)
PROT SERPL-MCNC: 8.4 G/DL (ref 6.4–8.2)
RBC # BLD AUTO: 4.85 X10*6/UL (ref 4–5.2)
SODIUM SERPL-SCNC: 139 MMOL/L (ref 136–145)
TRIGL SERPL-MCNC: 276 MG/DL (ref 0–149)
TSH SERPL-ACNC: 1.83 MIU/L (ref 0.44–3.98)
VLDL: 55 MG/DL (ref 0–40)
WBC # BLD AUTO: 8.7 X10*3/UL (ref 4.4–11.3)

## 2024-09-17 PROCEDURE — 80061 LIPID PANEL: CPT

## 2024-09-17 PROCEDURE — 36415 COLL VENOUS BLD VENIPUNCTURE: CPT

## 2024-09-17 PROCEDURE — 80053 COMPREHEN METABOLIC PANEL: CPT

## 2024-09-17 PROCEDURE — 83036 HEMOGLOBIN GLYCOSYLATED A1C: CPT

## 2024-09-17 PROCEDURE — 85025 COMPLETE CBC W/AUTO DIFF WBC: CPT

## 2024-09-17 PROCEDURE — 84443 ASSAY THYROID STIM HORMONE: CPT

## 2024-09-18 ENCOUNTER — APPOINTMENT (OUTPATIENT)
Dept: PRIMARY CARE | Facility: CLINIC | Age: 44
End: 2024-09-18
Payer: COMMERCIAL

## 2024-09-18 VITALS — WEIGHT: 154 LBS | BODY MASS INDEX: 24.17 KG/M2 | HEIGHT: 67 IN

## 2024-09-18 DIAGNOSIS — M79.7 FIBROMYALGIA: ICD-10-CM

## 2024-09-18 DIAGNOSIS — R07.81 RIB PAIN: ICD-10-CM

## 2024-09-18 DIAGNOSIS — E78.2 HYPERCHOLESTEROLEMIA WITH HYPERTRIGLYCERIDEMIA: Primary | ICD-10-CM

## 2024-09-18 PROBLEM — M84.30XA STRESS FRACTURE: Status: ACTIVE | Noted: 2024-09-18

## 2024-09-18 PROBLEM — B37.0 CANDIDIASIS OF MOUTH: Status: ACTIVE | Noted: 2024-09-18

## 2024-09-18 PROBLEM — R50.9 FEVER: Status: ACTIVE | Noted: 2024-09-18

## 2024-09-18 PROBLEM — R07.89 SENSATION OF CHEST TIGHTNESS: Status: ACTIVE | Noted: 2024-09-18

## 2024-09-18 PROBLEM — L03.90 CELLULITIS: Status: ACTIVE | Noted: 2024-09-18

## 2024-09-18 PROBLEM — R06.09 DYSPNEA ON EXERTION: Status: ACTIVE | Noted: 2024-09-18

## 2024-09-18 PROBLEM — J20.9 ACUTE BRONCHITIS: Status: ACTIVE | Noted: 2024-09-18

## 2024-09-18 PROBLEM — U07.1 DISEASE DUE TO SEVERE ACUTE RESPIRATORY SYNDROME CORONAVIRUS 2 (SARS-COV-2): Status: ACTIVE | Noted: 2024-09-18

## 2024-09-18 PROBLEM — R05.9 COUGH: Status: ACTIVE | Noted: 2024-09-18

## 2024-09-18 PROBLEM — J11.1 INFLUENZA: Status: ACTIVE | Noted: 2024-09-18

## 2024-09-18 PROBLEM — R11.0 NAUSEA: Status: ACTIVE | Noted: 2024-09-18

## 2024-09-18 PROBLEM — J06.9 ACUTE UPPER RESPIRATORY INFECTION: Status: ACTIVE | Noted: 2022-12-01

## 2024-09-18 PROBLEM — R09.89 PULMONARY CONGESTION: Status: ACTIVE | Noted: 2024-09-18

## 2024-09-18 LAB
EST. AVERAGE GLUCOSE BLD GHB EST-MCNC: 100 MG/DL
HBA1C MFR BLD: 5.1 %

## 2024-09-18 PROCEDURE — 3008F BODY MASS INDEX DOCD: CPT | Performed by: NURSE PRACTITIONER

## 2024-09-18 PROCEDURE — 1036F TOBACCO NON-USER: CPT | Performed by: NURSE PRACTITIONER

## 2024-09-18 PROCEDURE — 99214 OFFICE O/P EST MOD 30 MIN: CPT | Performed by: NURSE PRACTITIONER

## 2024-09-18 RX ORDER — PREDNISONE 10 MG/1
30 TABLET ORAL DAILY
Qty: 15 TABLET | Refills: 1 | Status: SHIPPED | OUTPATIENT
Start: 2024-09-18

## 2024-09-18 RX ORDER — FLUTICASONE PROPIONATE 50 MCG
1 SPRAY, SUSPENSION (ML) NASAL EVERY 12 HOURS
COMMUNITY

## 2024-09-18 RX ORDER — GABAPENTIN 600 MG/1
600 TABLET ORAL 3 TIMES DAILY
Qty: 90 TABLET | Refills: 2 | Status: SHIPPED | OUTPATIENT
Start: 2024-09-18

## 2024-09-18 RX ORDER — ALPRAZOLAM 0.5 MG/1
0.5 TABLET ORAL EVERY 12 HOURS
COMMUNITY

## 2024-09-18 ASSESSMENT — ENCOUNTER SYMPTOMS
CONFUSION: 0
WOUND: 0
NUMBNESS: 0
BACK PAIN: 0
ABDOMINAL DISTENTION: 0
COUGH: 0
DIARRHEA: 0
HEMATURIA: 0
DYSURIA: 0
DIZZINESS: 0
UNEXPECTED WEIGHT CHANGE: 0
FACIAL ASYMMETRY: 0
SHORTNESS OF BREATH: 0
WEAKNESS: 0
FEVER: 0
DIFFICULTY URINATING: 0
CHOKING: 0
MYALGIAS: 0
FATIGUE: 0
FLANK PAIN: 0
VOMITING: 0
APNEA: 0
ABDOMINAL PAIN: 0
SEIZURES: 0
SORE THROAT: 0
CHEST TIGHTNESS: 0
JOINT SWELLING: 0
FREQUENCY: 0
WHEEZING: 0
ARTHRALGIAS: 1
BLOOD IN STOOL: 0
SLEEP DISTURBANCE: 0
NERVOUS/ANXIOUS: 0
HEADACHES: 0
EYE REDNESS: 0
CHILLS: 0
CONSTIPATION: 0
TROUBLE SWALLOWING: 0
PALPITATIONS: 0
EYE PAIN: 0
PHOTOPHOBIA: 0
SPEECH DIFFICULTY: 0
NAUSEA: 0
NECK PAIN: 0

## 2024-09-18 NOTE — PROGRESS NOTES
Subjective   Patient ID: Elva Perez is a 44 y.o. female who presents for Follow-up (3 MONTH FOLLOW UP MED REFILL +LABS C/O FELL LAST THURSDAY AND C/O RIB PAIN ).    VIRTUAL APPOINTMENT BEING PERFORMED    HPI:  Presents today for [facility] LABS. HGA1C STILL PENDING. MED REFILL OF GABAPENTIN WHICH SHE TAKES FOR FIBRO. SHE IS DOING WELL ON MED AND DENIES SIDE EFFECTS. C/O RIGHT RIB PAIN ON/OFF X 6 DAYS  modifying factors consists of FELL DOWN 2 STAIRS.  SHE DID NOT HIT HER HEAD. NO LOCassociated symptoms consist of PAIN WORE WITH DEEP BREATHING.  prior treatment consists of medication NONE    LIPIDS- REFUSING MED MANAGEMENT AT THIS TIME. DIET MODIFICATIONS DISCUSSED  REFUSING MAMMO SCREEN AT THIS TIME      OARRS:  MAHAMED Jackson-CNP on 9/18/2024 11:53 AM  I have personally reviewed the OARRS report for Elva Perez. I have considered the risks of abuse, dependence, addiction and diversion and I believe that it is clinically appropriate for Elva Perez to be prescribed this medication    Is the patient prescribed a combination of a benzodiazepine and opioid?  No    Last Urine Drug Screen / ordered today: No  Recent Results (from the past 8760 hour(s))   Confirmation Opiate/Opioid/Benzo Prescription Compliance    Collection Time: 03/13/24 11:24 AM   Result Value Ref Range    Clonazepam <25 <25 ng/mL    7-Aminoclonazepam <25 <25 ng/mL    Alprazolam <25 <25 ng/mL    Alpha-Hydroxyalprazolam <25 <25 ng/mL    Midazolam <25 <25 ng/mL    Alpha-Hydroxymidazolam <25 <25 ng/mL    Chlordiazepoxide <25 <25 ng/mL    Diazepam <25 <25 ng/mL    Nordiazepam <25 <25 ng/mL    Temazepam <25 <25 ng/mL    Oxazepam <25 <25 ng/mL    Lorazepam <25 <25 ng/mL    Methadone <25 <25 ng/mL    EDDP <25 <25 ng/mL    6-Acetylmorphine <25 <25 ng/mL    Codeine <50 <50 ng/mL    Hydrocodone <25 <25 ng/mL    Hydromorphone <25 <25 ng/mL    Morphine  <50 <50 ng/mL    Norhydrocodone <25 <25 ng/mL    Noroxycodone <25 <25 ng/mL     "Oxycodone <25 <25 ng/mL    Oxymorphone <25 <25 ng/mL    Fentanyl <2.5 <2.5 ng/mL    Norfentanyl <2.5 <2.5 ng/mL    Tramadol <50 <50 ng/mL    O-Desmethyltramadol <50 <50 ng/mL    Zolpidem <25 <25 ng/mL    Zolpidem Metabolite (ZCA) <25 <25 ng/mL   Screen Opiate/Opioid/Benzo Prescription Compliance    Collection Time: 03/13/24 11:24 AM   Result Value Ref Range    Creatinine, Urine Random 33.2 20.0 - 320.0 mg/dL    Amphetamine Screen, Urine Presumptive Negative Presumptive Negative    Barbiturate Screen, Urine Presumptive Negative Presumptive Negative    Cannabinoid Screen, Urine Presumptive Negative Presumptive Negative    Cocaine Metabolite Screen, Urine Presumptive Negative Presumptive Negative    PCP Screen, Urine Presumptive Negative Presumptive Negative     Results are as expected.         Controlled Substance Agreement:  Date of the Last Agreement: 03/13/2024  Reviewed Controlled Substance Agreement including but not limited to the benefits, risks, and alternatives to treatment with a Controlled Substance medication(s).      Visit Vitals  Ht 1.689 m (5' 6.5\")   Wt 69.9 kg (154 lb)   BMI 24.48 kg/m²   Smoking Status Never   BSA 1.81 m²        Review of Systems   Constitutional:  Negative for chills, fatigue, fever and unexpected weight change.   HENT:  Negative for congestion, ear pain, sore throat and trouble swallowing.    Eyes:  Negative for photophobia, pain, redness and visual disturbance.   Respiratory:  Negative for apnea, cough, choking, chest tightness, shortness of breath and wheezing.    Cardiovascular:  Negative for chest pain, palpitations and leg swelling.   Gastrointestinal:  Negative for abdominal distention, abdominal pain, blood in stool, constipation, diarrhea, nausea and vomiting.   Genitourinary:  Negative for difficulty urinating, dysuria, flank pain, frequency, hematuria and urgency.   Musculoskeletal:  Positive for arthralgias. Negative for back pain, gait problem, joint swelling, myalgias " and neck pain.   Skin:  Negative for rash and wound.   Neurological:  Negative for dizziness, seizures, syncope, facial asymmetry, speech difficulty, weakness, numbness and headaches.   Psychiatric/Behavioral:  Negative for confusion, sleep disturbance and suicidal ideas. The patient is not nervous/anxious.        Objective   Component      Latest Ref Rng 9/17/2024   WBC      4.4 - 11.3 x10*3/uL 8.7    nRBC      0.0 - 0.0 /100 WBCs 0.0    RBC      4.00 - 5.20 x10*6/uL 4.85    HEMOGLOBIN      12.0 - 16.0 g/dL 15.2    HEMATOCRIT      36.0 - 46.0 % 46.0    MCV      80 - 100 fL 95    MCH      26.0 - 34.0 pg 31.3    MCHC      32.0 - 36.0 g/dL 33.0    RED CELL DISTRIBUTION WIDTH      11.5 - 14.5 % 13.2    Platelets      150 - 450 x10*3/uL 268    Neutrophils %      40.0 - 80.0 % 60.0    Immature Granulocytes %, Automated      0.0 - 0.9 % 0.5    Lymphocytes %      13.0 - 44.0 % 29.0    Monocytes %      2.0 - 10.0 % 6.1    Eosinophils %      0.0 - 6.0 % 3.1    Basophils %      0.0 - 2.0 % 1.3    Neutrophils Absolute      1.20 - 7.70 x10*3/uL 5.23    Immature Granulocytes Absolute, Automated      0.00 - 0.70 x10*3/uL 0.04    Lymphocytes Absolute      1.20 - 4.80 x10*3/uL 2.53    Monocytes Absolute      0.10 - 1.00 x10*3/uL 0.53    Eosinophils Absolute      0.00 - 0.70 x10*3/uL 0.27    Basophils Absolute      0.00 - 0.10 x10*3/uL 0.11 (H)    GLUCOSE      74 - 99 mg/dL 94    SODIUM      136 - 145 mmol/L 139    POTASSIUM      3.5 - 5.3 mmol/L 3.8    CHLORIDE      98 - 107 mmol/L 101    Bicarbonate      21 - 32 mmol/L 27    Anion Gap      10 - 20 mmol/L 15    Blood Urea Nitrogen      6 - 23 mg/dL 15    Creatinine      0.50 - 1.05 mg/dL 0.89    EGFR      >60 mL/min/1.73m*2 81    Calcium      8.6 - 10.3 mg/dL 9.6    Albumin      3.4 - 5.0 g/dL 5.0    Alkaline Phosphatase      33 - 110 U/L 96    Total Protein      6.4 - 8.2 g/dL 8.4 (H)    AST      9 - 39 U/L 18    Bilirubin Total      0.0 - 1.2 mg/dL 0.7    ALT      7 - 45 U/L 22     CHOLESTEROL      0 - 199 mg/dL 303 (H)    HDL CHOLESTEROL      mg/dL 49.0    Cholesterol/HDL Ratio 6.2    LDL Calculated      <=99 mg/dL 199 (H)    VLDL      0 - 40 mg/dL 55 (H)    TRIGLYCERIDES      0 - 149 mg/dL 276 (H)    Non HDL Cholesterol      0 - 149 mg/dL 254 (H)    Thyroid Stimulating Hormone      0.44 - 3.98 mIU/L 1.83       Legend:  (H) High  Physical Exam  Neurological:      Mental Status: She is alert and oriented to person, place, and time.   Psychiatric:         Mood and Affect: Mood normal.         Behavior: Behavior normal.         Thought Content: Thought content normal.         Judgment: Judgment normal.            Assessment/Plan   Problem List Items Addressed This Visit       Fibromyalgia    Relevant Medications    gabapentin (Neurontin) 600 mg tablet    Hypercholesterolemia with hypertriglyceridemia - Primary    Relevant Orders    CBC and Auto Differential    Comprehensive Metabolic Panel    Hemoglobin A1C    Lipid Panel    TSH with reflex to Free T4 if abnormal     Other Visit Diagnoses       Rib pain        Relevant Medications    predniSONE (Deltasone) 10 mg tablet        REFUSING XR OF RIBS AT THIS TIME. PO PREDNISONE SENT. WE DISCUSSED MOST COMMON SIDE EFFECTS OF PRESCRIBED MEDICATIONS. INDICATIONS, RISK, COMPLICATIONS, AND ALTERNATIVES OF MEDICATION/THERAPEUTICS WERE EXPLAINED AND DISCUSSED. PLEASE MONITOR CLOSELY FOR ANY UNTOWARD SIDE EFFECTS OR COMPLICATIONS OF MEDICATIONS. PATIENT IS STRONGLY ADVISED TO BE COMPLIANT WITH RECOMMENDATIONS. QUESTIONS AND CONCERNS WERE ADDRESSED. INSTRUCTED TO CALL, RETURN SOONER, OR GO TO THE ER,  IF SYMPTOMS PERSIST OR WORSEN. THEY VOICED UNDERSTANDING AND  DENIES FURTHER QUESTIONS AT THIS TIME.    TIME CODE  1. PREPARATION FOR PATIENT'S VISIT (REVIEWING CHART, CURRENT MEDICAL RECORDS, OUTSIDE HEALTH PROVIDER RECORDS, PREVIOUS HISTORY, EXAM, TEST, PROCEDURE, AND MEDICATIONS)  2. FACE TO FACE ENCOUNTER OBTAINING HISTORY FROM THE  PATIENT/FAMILY/CAREGIVERS; PERFORMING EVALUATION AND EXAMINATION; ORDERING TESTS OR PROCEDURES; REFERRING AND COMMUNICATING WITH OTHER HEALTHCARE PROVIDERS; COUNSELING AND EDUCATION OF THE PATIENT/FAMILY/CAREGIVERS; INDEPENDENTLY INTERPRETING RESULTS (TESTS, LABS, PROCEDURES, IMAGING) AND COMMUNICATING AND EXPLAINING RESULTS TO THE PATIENT/FAMILY/CAREGIVERS  3. COORDINATION OF CARE; PREPARING AND PRINTING DISCHARGE INSTRUCTIONS AND ANY EDUCATIONAL MATERIAL FOR THE PATIENT/FAMILY/CAREGIVERS. DOCUMENTING CLINICAL INFORMATION IN THE ELECTRONIC MEDICAL RECORD   4. REVIEWING OARRS AS NEEDED    MDM  1) COMPLEXITY: MORE THAN 1 STABLE CHRONIC CONDITION ADDRESSED OR 1 ACUTE ILLNESS ADDRESSED   2)DATA: TESTS INTERPRETED AND OR ORDERED, TOOK INDEPENDENT HISTORY OR RECORDS REVIEWED  3)RISK: MODERATE RISK DUE TO NATURE OF MEDICAL CONDITIONS/COMORBIDITY OR MEDICATIONS ORDERED OR SURGICAL OR PROCEDURE REFERRAL    3 MONTHS MED REFILL

## 2024-09-26 ENCOUNTER — APPOINTMENT (OUTPATIENT)
Dept: GASTROENTEROLOGY | Facility: CLINIC | Age: 44
End: 2024-09-26
Payer: COMMERCIAL

## 2024-12-10 ENCOUNTER — TELEMEDICINE (OUTPATIENT)
Dept: PRIMARY CARE | Facility: CLINIC | Age: 44
End: 2024-12-10
Payer: COMMERCIAL

## 2024-12-10 VITALS — HEIGHT: 66 IN | WEIGHT: 154 LBS | BODY MASS INDEX: 24.75 KG/M2

## 2024-12-10 DIAGNOSIS — R09.81 NASAL CONGESTION: ICD-10-CM

## 2024-12-10 DIAGNOSIS — J01.00 ACUTE NON-RECURRENT MAXILLARY SINUSITIS: Primary | ICD-10-CM

## 2024-12-10 DIAGNOSIS — M79.7 FIBROMYALGIA: ICD-10-CM

## 2024-12-10 PROCEDURE — 99214 OFFICE O/P EST MOD 30 MIN: CPT | Performed by: NURSE PRACTITIONER

## 2024-12-10 PROCEDURE — 3008F BODY MASS INDEX DOCD: CPT | Performed by: NURSE PRACTITIONER

## 2024-12-10 PROCEDURE — 1036F TOBACCO NON-USER: CPT | Performed by: NURSE PRACTITIONER

## 2024-12-10 RX ORDER — AZITHROMYCIN 250 MG/1
TABLET, FILM COATED ORAL
Qty: 6 TABLET | Refills: 0 | Status: SHIPPED | OUTPATIENT
Start: 2024-12-10 | End: 2024-12-14

## 2024-12-10 RX ORDER — GABAPENTIN 600 MG/1
600 TABLET ORAL 3 TIMES DAILY
Qty: 90 TABLET | Refills: 2 | Status: SHIPPED | OUTPATIENT
Start: 2024-12-10

## 2024-12-10 RX ORDER — COVID-19 ANTIGEN TEST
1 KIT MISCELLANEOUS ONCE
Qty: 2 EACH | Refills: 1 | Status: SHIPPED | OUTPATIENT
Start: 2024-12-10 | End: 2024-12-10

## 2024-12-10 RX ORDER — PREDNISONE 10 MG/1
30 TABLET ORAL DAILY
Qty: 15 TABLET | Refills: 0 | Status: SHIPPED | OUTPATIENT
Start: 2024-12-10

## 2024-12-10 ASSESSMENT — ENCOUNTER SYMPTOMS
FEVER: 0
UNEXPECTED WEIGHT CHANGE: 0
EYE PAIN: 0
FATIGUE: 0
VOMITING: 0
ABDOMINAL DISTENTION: 0
DYSURIA: 0
TROUBLE SWALLOWING: 0
SEIZURES: 0
FLANK PAIN: 0
ARTHRALGIAS: 0
BACK PAIN: 0
APNEA: 0
DIARRHEA: 0
WEAKNESS: 0
WHEEZING: 0
NERVOUS/ANXIOUS: 0
FACIAL ASYMMETRY: 0
WOUND: 0
SPEECH DIFFICULTY: 0
NAUSEA: 0
HEADACHES: 0
CHOKING: 0
NECK PAIN: 0
SLEEP DISTURBANCE: 0
JOINT SWELLING: 0
CHILLS: 0
SORE THROAT: 0
PALPITATIONS: 0
CHEST TIGHTNESS: 0
CONFUSION: 0
COUGH: 0
NUMBNESS: 0
SHORTNESS OF BREATH: 0
DIZZINESS: 0
PHOTOPHOBIA: 0
HEMATURIA: 0
MYALGIAS: 0
DIFFICULTY URINATING: 0
EYE REDNESS: 0
CONSTIPATION: 0
ABDOMINAL PAIN: 0
BLOOD IN STOOL: 0
FREQUENCY: 0

## 2024-12-10 NOTE — PROGRESS NOTES
Subjective   Patient ID: Elva Perez is a 44 y.o. female who presents for Sinus Problem (C/O SINUS CONGESTION, THROAT IS SORE, NO FEVER).    Virtual or Telephone Consent    An interactive audio and video telecommunication system which permits real time communications between the patient (at the originating site) and provider (at the distant site) was utilized to provide this telehealth service.   Verbal consent was requested and obtained from Elva Perez on this date, 12/10/24 for a telehealth visit.     HPI:  Presents today for C/O NASAL CONGESTION X 2 WEEKS THAT HAS GOTTEN WORSE OVER THE PAST FEW DAYS  modifying factors consists of NO COVID TEST TAKEN associated symptoms consist of SINUS PRESSURE. HA. NO SOB OR CP.  prior treatment consists of medication FLONASE AND DAYQUIL     MED REFILL OF GABAPENTIN WHICH SHE TAKES FOR FIBRO. SHE IS DOING WELL ON MED AND DENIES SIDE EFFECTS.      REFUSING MAMMO AND COLON/COLOGAURD AT THIS TIME     OARRS:  Alejandra Waterman, MAHAMED-CNP on 12/10/2024  2:14 PM  I have personally reviewed the OARRS report for Elva Perez. I have considered the risks of abuse, dependence, addiction and diversion and I believe that it is clinically appropriate for Elva Perez to be prescribed this medication    Is the patient prescribed a combination of a benzodiazepine and opioid?  No    Last Urine Drug Screen / ordered today: No  Recent Results (from the past 8760 hours)   Confirmation Opiate/Opioid/Benzo Prescription Compliance    Collection Time: 03/13/24 11:24 AM   Result Value Ref Range    Clonazepam <25 <25 ng/mL    7-Aminoclonazepam <25 <25 ng/mL    Alprazolam <25 <25 ng/mL    Alpha-Hydroxyalprazolam <25 <25 ng/mL    Midazolam <25 <25 ng/mL    Alpha-Hydroxymidazolam <25 <25 ng/mL    Chlordiazepoxide <25 <25 ng/mL    Diazepam <25 <25 ng/mL    Nordiazepam <25 <25 ng/mL    Temazepam <25 <25 ng/mL    Oxazepam <25 <25 ng/mL    Lorazepam <25 <25 ng/mL    Methadone <25  "<25 ng/mL    EDDP <25 <25 ng/mL    6-Acetylmorphine <25 <25 ng/mL    Codeine <50 <50 ng/mL    Hydrocodone <25 <25 ng/mL    Hydromorphone <25 <25 ng/mL    Morphine  <50 <50 ng/mL    Norhydrocodone <25 <25 ng/mL    Noroxycodone <25 <25 ng/mL    Oxycodone <25 <25 ng/mL    Oxymorphone <25 <25 ng/mL    Fentanyl <2.5 <2.5 ng/mL    Norfentanyl <2.5 <2.5 ng/mL    Tramadol <50 <50 ng/mL    O-Desmethyltramadol <50 <50 ng/mL    Zolpidem <25 <25 ng/mL    Zolpidem Metabolite (ZCA) <25 <25 ng/mL   Screen Opiate/Opioid/Benzo Prescription Compliance    Collection Time: 03/13/24 11:24 AM   Result Value Ref Range    Creatinine, Urine Random 33.2 20.0 - 320.0 mg/dL    Amphetamine Screen, Urine Presumptive Negative Presumptive Negative    Barbiturate Screen, Urine Presumptive Negative Presumptive Negative    Cannabinoid Screen, Urine Presumptive Negative Presumptive Negative    Cocaine Metabolite Screen, Urine Presumptive Negative Presumptive Negative    PCP Screen, Urine Presumptive Negative Presumptive Negative     Results are as expected.         Controlled Substance Agreement:  Date of the Last Agreement: 3/13/24  Reviewed Controlled Substance Agreement including but not limited to the benefits, risks, and alternatives to treatment with a Controlled Substance medication(s).      Visit Vitals  Ht 1.676 m (5' 6\")   Wt 69.9 kg (154 lb)   BMI 24.86 kg/m²   Smoking Status Never   BSA 1.8 m²        Review of Systems   Constitutional:  Negative for chills, fatigue, fever and unexpected weight change.   HENT:  Negative for congestion, ear pain, sore throat and trouble swallowing.    Eyes:  Negative for photophobia, pain, redness and visual disturbance.   Respiratory:  Negative for apnea, cough, choking, chest tightness, shortness of breath and wheezing.    Cardiovascular:  Negative for chest pain, palpitations and leg swelling.   Gastrointestinal:  Negative for abdominal distention, abdominal pain, blood in stool, constipation, diarrhea, " nausea and vomiting.   Genitourinary:  Negative for difficulty urinating, dysuria, flank pain, frequency, hematuria and urgency.   Musculoskeletal:  Negative for arthralgias, back pain, gait problem, joint swelling, myalgias and neck pain.   Skin:  Negative for rash and wound.   Neurological:  Negative for dizziness, seizures, syncope, facial asymmetry, speech difficulty, weakness, numbness and headaches.   Psychiatric/Behavioral:  Negative for confusion, sleep disturbance and suicidal ideas. The patient is not nervous/anxious.        Objective     Physical Exam  HENT:      Head:      Comments: B/L MAXILLARY SINUS PRESSURE   Neurological:      Mental Status: She is alert and oriented to person, place, and time.   Psychiatric:         Mood and Affect: Mood normal.         Behavior: Behavior normal.         Thought Content: Thought content normal.         Judgment: Judgment normal.            Assessment/Plan   Problem List Items Addressed This Visit       Fibromyalgia    Relevant Medications    gabapentin (Neurontin) 600 mg tablet     Other Visit Diagnoses       Acute non-recurrent maxillary sinusitis    -  Primary    Relevant Medications    azithromycin (Zithromax) 250 mg tablet    predniSONE (Deltasone) 10 mg tablet    Nasal congestion        Relevant Medications    COVID-19 antigen test (COVID-19 At-Home Test) kit        CALL WITH COVID-19 TESTING RESULTS. REFUSING SOONER FU APPT AT THIS TIME. . INSTRUCTED TO SELF QUARANTINE AND TO PRACTICE GOOD HAND WASHING TECHNIQUES. PT WAS INSTRUCTED TO INCREASE FLUID INTAKE AND TAKE TYLENOL 650 MG PO Q6H/PRN FOR PAIN OR FEVER. RETURN SOONER OR GO TO THE ER IF SYMPTOMS PERSIST OR WORSEN    WE DISCUSSED MOST COMMON SIDE EFFECTS OF PRESCRIBED MEDICATIONS. INDICATIONS, RISK, COMPLICATIONS, AND ALTERNATIVES OF MEDICATION/THERAPEUTICS WERE EXPLAINED AND DISCUSSED. PLEASE MONITOR CLOSELY FOR ANY UNTOWARD SIDE EFFECTS OR COMPLICATIONS OF MEDICATIONS. PATIENT IS STRONGLY ADVISED TO BE  COMPLIANT WITH RECOMMENDATIONS. QUESTIONS AND CONCERNS WERE ADDRESSED. INSTRUCTED TO CALL, RETURN SOONER, OR GO TO THE ER,  IF SYMPTOMS PERSIST OR WORSEN. THEY VOICED UNDERSTANDING AND  DENIES FURTHER QUESTIONS AT THIS TIME.    TIME CODE  1. PREPARATION FOR PATIENT'S VISIT (REVIEWING CHART, CURRENT MEDICAL RECORDS, OUTSIDE HEALTH PROVIDER RECORDS, PREVIOUS HISTORY, EXAM, TEST, PROCEDURE, AND MEDICATIONS)  2. FACE TO FACE ENCOUNTER OBTAINING HISTORY FROM THE PATIENT/FAMILY/CAREGIVERS; PERFORMING EVALUATION AND EXAMINATION; ORDERING TESTS OR PROCEDURES; REFERRING AND COMMUNICATING WITH OTHER HEALTHCARE PROVIDERS; COUNSELING AND EDUCATION OF THE PATIENT/FAMILY/CAREGIVERS; INDEPENDENTLY INTERPRETING RESULTS (TESTS, LABS, PROCEDURES, IMAGING) AND COMMUNICATING AND EXPLAINING RESULTS TO THE PATIENT/FAMILY/CAREGIVERS  3. COORDINATION OF CARE; PREPARING AND PRINTING DISCHARGE INSTRUCTIONS AND ANY EDUCATIONAL MATERIAL FOR THE PATIENT/FAMILY/CAREGIVERS. DOCUMENTING CLINICAL INFORMATION IN THE ELECTRONIC MEDICAL RECORD   4. REVIEWING OARRS AS NEEDED    MDM  1) COMPLEXITY: MORE THAN 1 STABLE CHRONIC CONDITION ADDRESSED OR 1 ACUTE ILLNESS ADDRESSED   2)DATA: TESTS INTERPRETED AND OR ORDERED, TOOK INDEPENDENT HISTORY OR RECORDS REVIEWED  3)RISK: MODERATE RISK DUE TO NATURE OF MEDICAL CONDITIONS/COMORBIDITY OR MEDICATIONS ORDERED OR SURGICAL OR PROCEDURE REFERRAL    3 MONTHS MED REFILL

## 2024-12-30 ENCOUNTER — TELEPHONE (OUTPATIENT)
Dept: PRIMARY CARE | Facility: CLINIC | Age: 44
End: 2024-12-30
Payer: COMMERCIAL

## 2024-12-30 NOTE — TELEPHONE ENCOUNTER
PT CALLED STATES THAT SHE HAS CYST AGAIN ALONG HER PANTIE LINE, STATES THAT THIS TIME THEY ARE BIGGER. PT WAS ASKING ABOUT AND ABX FOR THEM. LET PT KNOW THAT YOU ARE OUT OF OFFICE TIL THURSDAY AND THAT SHE MAY NEED OC FOR THIS. PLEASE ADVISE.

## 2025-01-09 ENCOUNTER — APPOINTMENT (OUTPATIENT)
Dept: PRIMARY CARE | Facility: CLINIC | Age: 45
End: 2025-01-09
Payer: COMMERCIAL

## 2025-01-15 ENCOUNTER — OFFICE VISIT (OUTPATIENT)
Dept: PRIMARY CARE | Facility: CLINIC | Age: 45
End: 2025-01-15
Payer: COMMERCIAL

## 2025-01-15 VITALS
DIASTOLIC BLOOD PRESSURE: 107 MMHG | HEIGHT: 66 IN | HEART RATE: 83 BPM | BODY MASS INDEX: 27.56 KG/M2 | SYSTOLIC BLOOD PRESSURE: 179 MMHG | WEIGHT: 171.5 LBS

## 2025-01-15 DIAGNOSIS — M62.838 MUSCLE SPASM: ICD-10-CM

## 2025-01-15 DIAGNOSIS — I10 BENIGN ESSENTIAL HYPERTENSION: Primary | ICD-10-CM

## 2025-01-15 DIAGNOSIS — R30.0 DYSURIA: ICD-10-CM

## 2025-01-15 DIAGNOSIS — K76.0 HEPATIC STEATOSIS: ICD-10-CM

## 2025-01-15 DIAGNOSIS — R10.32 ABDOMINAL PAIN, LLQ: ICD-10-CM

## 2025-01-15 DIAGNOSIS — R10.2 ABDOMINAL PAIN, SUPRAPUBIC: ICD-10-CM

## 2025-01-15 DIAGNOSIS — Z87.19 HISTORY OF CONSTIPATION: ICD-10-CM

## 2025-01-15 DIAGNOSIS — R10.31 ABDOMINAL PAIN, RLQ: ICD-10-CM

## 2025-01-15 DIAGNOSIS — K57.92 ACUTE DIVERTICULITIS: ICD-10-CM

## 2025-01-15 PROBLEM — B37.0 CANDIDIASIS OF MOUTH: Status: RESOLVED | Noted: 2024-09-18 | Resolved: 2025-01-15

## 2025-01-15 PROBLEM — L03.90 CELLULITIS: Status: RESOLVED | Noted: 2024-09-18 | Resolved: 2025-01-15

## 2025-01-15 PROBLEM — R50.9 FEVER: Status: RESOLVED | Noted: 2024-09-18 | Resolved: 2025-01-15

## 2025-01-15 PROBLEM — R05.9 COUGH: Status: RESOLVED | Noted: 2024-09-18 | Resolved: 2025-01-15

## 2025-01-15 PROBLEM — M84.30XA STRESS FRACTURE: Status: RESOLVED | Noted: 2024-09-18 | Resolved: 2025-01-15

## 2025-01-15 PROBLEM — R63.0 POOR APPETITE: Status: RESOLVED | Noted: 2023-11-27 | Resolved: 2025-01-15

## 2025-01-15 PROBLEM — J11.1 INFLUENZA: Status: RESOLVED | Noted: 2024-09-18 | Resolved: 2025-01-15

## 2025-01-15 PROBLEM — J20.9 ACUTE BRONCHITIS: Status: RESOLVED | Noted: 2024-09-18 | Resolved: 2025-01-15

## 2025-01-15 PROBLEM — R09.89 PULMONARY CONGESTION: Status: RESOLVED | Noted: 2024-09-18 | Resolved: 2025-01-15

## 2025-01-15 PROCEDURE — 3077F SYST BP >= 140 MM HG: CPT | Performed by: INTERNAL MEDICINE

## 2025-01-15 PROCEDURE — 3080F DIAST BP >= 90 MM HG: CPT | Performed by: INTERNAL MEDICINE

## 2025-01-15 PROCEDURE — 99214 OFFICE O/P EST MOD 30 MIN: CPT | Performed by: INTERNAL MEDICINE

## 2025-01-15 PROCEDURE — 3008F BODY MASS INDEX DOCD: CPT | Performed by: INTERNAL MEDICINE

## 2025-01-15 PROCEDURE — 1036F TOBACCO NON-USER: CPT | Performed by: INTERNAL MEDICINE

## 2025-01-15 RX ORDER — METRONIDAZOLE 500 MG/1
500 TABLET ORAL 3 TIMES DAILY
COMMUNITY
Start: 2025-01-14

## 2025-01-15 RX ORDER — CYCLOBENZAPRINE HCL 10 MG
10 TABLET ORAL NIGHTLY PRN
Qty: 10 TABLET | Refills: 0 | Status: SHIPPED | OUTPATIENT
Start: 2025-01-15 | End: 2025-01-25

## 2025-01-15 RX ORDER — PHENAZOPYRIDINE HYDROCHLORIDE 200 MG/1
200 TABLET, FILM COATED ORAL 3 TIMES DAILY PRN
Qty: 9 TABLET | Refills: 0 | Status: SHIPPED | OUTPATIENT
Start: 2025-01-15 | End: 2025-01-15

## 2025-01-15 RX ORDER — AMLODIPINE BESYLATE 2.5 MG/1
2.5 TABLET ORAL DAILY
Qty: 30 TABLET | Refills: 11 | Status: SHIPPED | OUTPATIENT
Start: 2025-01-15 | End: 2026-01-15

## 2025-01-15 RX ORDER — PHENAZOPYRIDINE HYDROCHLORIDE 200 MG/1
200 TABLET, FILM COATED ORAL 3 TIMES DAILY PRN
Qty: 9 TABLET | Refills: 0 | Status: SHIPPED | OUTPATIENT
Start: 2025-01-15 | End: 2025-01-18

## 2025-01-15 RX ORDER — CYCLOBENZAPRINE HCL 10 MG
10 TABLET ORAL NIGHTLY PRN
Qty: 10 TABLET | Refills: 0 | Status: SHIPPED | OUTPATIENT
Start: 2025-01-15 | End: 2025-01-15

## 2025-01-15 RX ORDER — KETOROLAC TROMETHAMINE 10 MG/1
10 TABLET, FILM COATED ORAL 3 TIMES DAILY PRN
COMMUNITY
Start: 2025-01-14

## 2025-01-15 RX ORDER — AMLODIPINE BESYLATE 2.5 MG/1
2.5 TABLET ORAL DAILY
Qty: 30 TABLET | Refills: 11 | Status: SHIPPED | OUTPATIENT
Start: 2025-01-15 | End: 2025-01-15

## 2025-01-15 RX ORDER — CEFDINIR 300 MG/1
300 CAPSULE ORAL 2 TIMES DAILY
COMMUNITY
Start: 2025-01-14

## 2025-01-15 ASSESSMENT — ENCOUNTER SYMPTOMS
ENDOCRINE NEGATIVE: 1
DIZZINESS: 0
PSYCHIATRIC NEGATIVE: 1
SHORTNESS OF BREATH: 0
AGITATION: 0
DIARRHEA: 0
MUSCULOSKELETAL NEGATIVE: 1
HEADACHES: 0
COUGH: 0
VOMITING: 0
ABDOMINAL DISTENTION: 0
CARDIOVASCULAR NEGATIVE: 1
FEVER: 0
EYES NEGATIVE: 1
NAUSEA: 0
ABDOMINAL PAIN: 1
WEAKNESS: 0
PALPITATIONS: 0
DYSURIA: 1
BACK PAIN: 0
LIGHT-HEADEDNESS: 0
RHINORRHEA: 0
CHILLS: 0
CONSTIPATION: 0
NEUROLOGICAL NEGATIVE: 1
WHEEZING: 0

## 2025-01-15 ASSESSMENT — PATIENT HEALTH QUESTIONNAIRE - PHQ9
1. LITTLE INTEREST OR PLEASURE IN DOING THINGS: NOT AT ALL
SUM OF ALL RESPONSES TO PHQ9 QUESTIONS 1 AND 2: 0
2. FEELING DOWN, DEPRESSED OR HOPELESS: NOT AT ALL

## 2025-01-15 NOTE — PROGRESS NOTES
Subjective   Patient ID: Elva Perez is a 44 y.o. female who presents for Follow-up (ER F/U FOR DIVERTICULITIS. C/O ABD SWELLING X3 DAYS).  HPI  F/U AFTER ER VISIT ([facility]) YESTERDAY FOR ABDOMINAL PAIN AND WAS DX WITH DIVERTICULITIS. HAD LABS,UA AND CT OF ABDOMEN/PELVIS DONE ,WAS STARTED ON CIPRO AND FLAGYL. COMPLAINS OF LLQ ABDOMINAL PAIN AND SUPRAPUBIC PAIN 8/10 WITH DYSURIA. HAD CONSTIPATION EARLIER WHICH IS RESOLVING BY TAKING THE ANTIBIOTIC. BP IS ELEVATED ,DENIES HEADACHE OR CHEST PAIN.  Review of Systems   Constitutional:  Negative for chills and fever.   HENT: Negative.  Negative for congestion, postnasal drip and rhinorrhea.    Eyes: Negative.  Negative for visual disturbance.   Respiratory:  Negative for cough, shortness of breath and wheezing.    Cardiovascular: Negative.  Negative for chest pain, palpitations and leg swelling.   Gastrointestinal:  Positive for abdominal pain. Negative for abdominal distention, constipation, diarrhea, nausea and vomiting.   Endocrine: Negative.    Genitourinary:  Positive for dysuria. Negative for urgency.   Musculoskeletal: Negative.  Negative for back pain.   Skin: Negative.  Negative for rash.   Allergic/Immunologic: Negative for immunocompromised state.   Neurological: Negative.  Negative for dizziness, weakness, light-headedness and headaches.   Psychiatric/Behavioral: Negative.  Negative for agitation.        Objective   Physical Exam  Constitutional:       General: She is not in acute distress.  HENT:      Head: Normocephalic.      Nose: Nose normal.      Mouth/Throat:      Mouth: Mucous membranes are moist.   Eyes:      Conjunctiva/sclera: Conjunctivae normal.      Pupils: Pupils are equal, round, and reactive to light.   Cardiovascular:      Rate and Rhythm: Normal rate and regular rhythm.      Pulses: Normal pulses.      Heart sounds: Normal heart sounds.   Pulmonary:      Effort: No respiratory distress.      Breath sounds: No wheezing.   Chest:       Chest wall: No tenderness.   Abdominal:      General: Abdomen is flat. Bowel sounds are normal.      Palpations: Abdomen is soft.      Tenderness: There is abdominal tenderness.      Comments: LLQ, SUPRAPUBIC AND RLQ TENDERNESS (WITH LIGHT PALPATION).   Musculoskeletal:         General: No tenderness. Normal range of motion.      Cervical back: Normal range of motion.   Lymphadenopathy:      Cervical: No cervical adenopathy.   Skin:     General: Skin is warm and dry.      Findings: No rash.   Neurological:      General: No focal deficit present.      Mental Status: She is alert. Mental status is at baseline.   Psychiatric:         Mood and Affect: Mood normal.         Behavior: Behavior normal.         Assessment/Plan   1. Benign essential hypertension  amLODIPine (Norvasc) 2.5 mg tablet    DISCONTINUED: amLODIPine (Norvasc) 2.5 mg tablet      2. Dysuria  phenazopyridine (Pyridium) 200 mg tablet    DISCONTINUED: phenazopyridine (Pyridium) 200 mg tablet      3. Muscle spasm  cyclobenzaprine (Flexeril) 10 mg tablet    DISCONTINUED: cyclobenzaprine (Flexeril) 10 mg tablet      4. Acute diverticulitis        5. Hepatic steatosis        6. Abdominal pain, LLQ        7. Abdominal pain, RLQ        8. Abdominal pain, suprapubic        9. History of constipation          TEST RESULTS WERE DISCUSSED.  ADVISED TO HAVE LOW FAT AND LOW CALORIE DIET, GATORADE (SINCE HAS POOR APPETITE) . I HGIHLY ADVISED TO FINISH THE CIPRO AND FLAGYL, ADVISED TO FOLLOW THE GLUTEN FREE DIET WITH HAVING H/O CONSTIPATION ,TO ADD MORE FIBER TO DIET.  EXPLAINED THE NEED FOR COLONOSCOPY WHEN THE PAIN IS RESOLVED.  Advised to EMPTY THE BLADDER FREQUENTLY, TO CUT DOWN CAFFEINE.  ADVISED TO GO TO ER IF ABDOMINAL PAIN GETS WORSE.     HTN addressed as follow:    MONITOR BP   GOAL BP LOWER THAN 130/80  LOW SALT. WILL ADD NORVASC 2.5 MG DAILY .    MDM    1) COMPLEXITY: 1 UNDIAGNOSED NEW PROBLEM WITH UNCERTAIN PROGNOSIS  2)DATA: TESTS INTERPRETED AND OR  ORDERED, TOOK INDEPENDENT HISTORY OR RECORDS REVIEWED  3)RISK: MODERATE RISK DUE TO NATURE OF MEDICAL CONDITIONS/COMORBIDITY OR MEDICATIONS ORDERED OR SURGICAL OR PROCEDURE REFERRAL, .     Has F/U

## 2025-01-22 ENCOUNTER — APPOINTMENT (OUTPATIENT)
Dept: PRIMARY CARE | Facility: CLINIC | Age: 45
End: 2025-01-22
Payer: COMMERCIAL

## 2025-03-11 ENCOUNTER — APPOINTMENT (OUTPATIENT)
Dept: PRIMARY CARE | Facility: CLINIC | Age: 45
End: 2025-03-11
Payer: COMMERCIAL

## 2025-07-23 DIAGNOSIS — Z12.31 ENCOUNTER FOR SCREENING MAMMOGRAM FOR BREAST CANCER: ICD-10-CM
